# Patient Record
Sex: MALE | Race: BLACK OR AFRICAN AMERICAN | NOT HISPANIC OR LATINO
[De-identification: names, ages, dates, MRNs, and addresses within clinical notes are randomized per-mention and may not be internally consistent; named-entity substitution may affect disease eponyms.]

---

## 2017-06-29 ENCOUNTER — APPOINTMENT (OUTPATIENT)
Dept: ELECTROPHYSIOLOGY | Facility: CLINIC | Age: 77
End: 2017-06-29
Payer: MEDICARE

## 2017-06-29 PROCEDURE — 93290 INTERROG DEV EVAL ICPMS IP: CPT

## 2017-06-29 PROCEDURE — 93284 PRGRMG EVAL IMPLANTABLE DFB: CPT

## 2018-06-04 ENCOUNTER — APPOINTMENT (OUTPATIENT)
Dept: ELECTROPHYSIOLOGY | Facility: CLINIC | Age: 78
End: 2018-06-04
Payer: MEDICARE

## 2018-06-04 VITALS — RESPIRATION RATE: 16 BRPM | SYSTOLIC BLOOD PRESSURE: 130 MMHG | HEART RATE: 66 BPM | DIASTOLIC BLOOD PRESSURE: 74 MMHG

## 2018-06-04 PROCEDURE — 93290 INTERROG DEV EVAL ICPMS IP: CPT | Mod: 26

## 2018-06-04 PROCEDURE — 93284 PRGRMG EVAL IMPLANTABLE DFB: CPT

## 2019-04-08 ENCOUNTER — APPOINTMENT (OUTPATIENT)
Dept: ELECTROPHYSIOLOGY | Facility: CLINIC | Age: 79
End: 2019-04-08

## 2019-07-10 ENCOUNTER — APPOINTMENT (OUTPATIENT)
Dept: ELECTROPHYSIOLOGY | Facility: CLINIC | Age: 79
End: 2019-07-10
Payer: MEDICARE

## 2019-07-10 VITALS — HEART RATE: 64 BPM | SYSTOLIC BLOOD PRESSURE: 144 MMHG | DIASTOLIC BLOOD PRESSURE: 82 MMHG | RESPIRATION RATE: 14 BRPM

## 2019-07-10 PROCEDURE — 93290 INTERROG DEV EVAL ICPMS IP: CPT | Mod: 26

## 2019-07-10 PROCEDURE — 93284 PRGRMG EVAL IMPLANTABLE DFB: CPT

## 2019-07-10 RX ORDER — LOSARTAN POTASSIUM 50 MG/1
50 TABLET, FILM COATED ORAL
Refills: 0 | Status: ACTIVE | COMMUNITY

## 2019-10-15 ENCOUNTER — APPOINTMENT (OUTPATIENT)
Dept: ELECTROPHYSIOLOGY | Facility: CLINIC | Age: 79
End: 2019-10-15

## 2020-04-09 ENCOUNTER — APPOINTMENT (OUTPATIENT)
Dept: ELECTROPHYSIOLOGY | Facility: CLINIC | Age: 80
End: 2020-04-09

## 2020-07-13 ENCOUNTER — APPOINTMENT (OUTPATIENT)
Dept: ELECTROPHYSIOLOGY | Facility: CLINIC | Age: 80
End: 2020-07-13

## 2020-10-13 ENCOUNTER — APPOINTMENT (OUTPATIENT)
Dept: ELECTROPHYSIOLOGY | Facility: CLINIC | Age: 80
End: 2020-10-13

## 2022-06-24 ENCOUNTER — APPOINTMENT (OUTPATIENT)
Dept: ELECTROPHYSIOLOGY | Facility: CLINIC | Age: 82
End: 2022-06-24
Payer: MEDICARE

## 2022-06-24 VITALS — SYSTOLIC BLOOD PRESSURE: 117 MMHG | DIASTOLIC BLOOD PRESSURE: 80 MMHG | HEART RATE: 70 BPM | RESPIRATION RATE: 14 BRPM

## 2022-06-24 VITALS — RESPIRATION RATE: 14 BRPM | HEART RATE: 90 BPM

## 2022-06-24 PROCEDURE — 93284 PRGRMG EVAL IMPLANTABLE DFB: CPT

## 2022-06-24 PROCEDURE — 93290 INTERROG DEV EVAL ICPMS IP: CPT | Mod: 26

## 2023-06-23 ENCOUNTER — APPOINTMENT (OUTPATIENT)
Dept: ELECTROPHYSIOLOGY | Facility: CLINIC | Age: 83
End: 2023-06-23

## 2024-01-02 ENCOUNTER — NON-APPOINTMENT (OUTPATIENT)
Age: 84
End: 2024-01-02

## 2024-01-02 ENCOUNTER — APPOINTMENT (OUTPATIENT)
Dept: ELECTROPHYSIOLOGY | Facility: CLINIC | Age: 84
End: 2024-01-02
Payer: MEDICARE

## 2024-01-02 VITALS
OXYGEN SATURATION: 97 % | HEIGHT: 67 IN | WEIGHT: 143 LBS | DIASTOLIC BLOOD PRESSURE: 60 MMHG | HEART RATE: 60 BPM | BODY MASS INDEX: 22.44 KG/M2 | SYSTOLIC BLOOD PRESSURE: 100 MMHG

## 2024-01-02 DIAGNOSIS — Z95.810 PRESENCE OF AUTOMATIC (IMPLANTABLE) CARDIAC DEFIBRILLATOR: ICD-10-CM

## 2024-01-02 DIAGNOSIS — Z45.02 ENCOUNTER FOR ADJUSTMENT AND MANAGEMENT OF AUTOMATIC IMPLANTABLE CARDIAC DEFIBRILLATOR: ICD-10-CM

## 2024-01-02 DIAGNOSIS — I45.10 UNSPECIFIED RIGHT BUNDLE-BRANCH BLOCK: ICD-10-CM

## 2024-01-02 DIAGNOSIS — I10 ESSENTIAL (PRIMARY) HYPERTENSION: ICD-10-CM

## 2024-01-02 PROCEDURE — 93284 PRGRMG EVAL IMPLANTABLE DFB: CPT

## 2024-01-02 PROCEDURE — 93000 ELECTROCARDIOGRAM COMPLETE: CPT | Mod: 59

## 2024-01-02 PROCEDURE — 99204 OFFICE O/P NEW MOD 45 MIN: CPT | Mod: 25

## 2024-01-02 RX ORDER — HYDROCHLOROTHIAZIDE 25 MG/1
25 TABLET ORAL
Refills: 0 | Status: ACTIVE | COMMUNITY

## 2024-01-02 NOTE — REASON FOR VISIT
[Arrhythmia/ECG Abnorrmalities] : arrhythmia/ECG abnormalities [FreeTextEntry3] : Dr Edvin Cervantes

## 2024-01-02 NOTE — HISTORY OF PRESENT ILLNESS
[FreeTextEntry1] : Mr. JEANCLAUDE CHASSAGNE is a 83 year old man with past medical history of history of cardiomyopathy, s/p Bi V ICD in 2009, HTN, DM,CAD, prostate cancer, CHF who is here for BIVICD (MDT) genchange evaluation. Reports he notices his device beeping since Dec 1st 2023. Admits doing well. Device interrogation today revealed the battery reached ROLY on 11/29/23. LV lead threshold is high, 2.125 volts at 1 msec. He is not dependent. Turned off beeping. No shocks or events noted. Feeling well now.  Denies chest pain, palpitations, shortness of breath, dyspnea on exertion, syncope, light headedness or dizziness.

## 2024-01-02 NOTE — DISCUSSION/SUMMARY
[FreeTextEntry1] : IMPRESSIONS:  BIVICD Gen change:  He is doing well. Device interrogation today revealed the battery reached ROLY on 11/29/23. LV lead threshold is high, 2.125 volts at 1 msec. He is not dependent. Device is beeping since 12/1/23, has turned it off today. No shocks or events noted. Will recommend Medtronic BIVICD Gen change and down grade to BIVPPM. We discussed the procedures, risks and outcomes of ICD generator change and living with an ICD. We discussed management of ICD therapy throughout life, including deactivation of the ICD. After all questions were answered, and literature was provided, it was a shared decision to proceed with ICD therapy. Hold diabetes medication and blood thinners the morning of the procedure, if applicable. May take all other medication with a small sip of water.  HTN: resume oral antihypertensives as prescribed. Encouraged heart healthy diet, sodium restriction, and weight loss. Continue regular f/u with Cardiologist for further HTN management. [EKG obtained to assist in diagnosis and management of assessed problem(s)] : EKG obtained to assist in diagnosis and management of assessed problem(s)

## 2024-01-10 ENCOUNTER — OUTPATIENT (OUTPATIENT)
Dept: OUTPATIENT SERVICES | Facility: HOSPITAL | Age: 84
LOS: 1 days | End: 2024-01-10

## 2024-01-10 VITALS
RESPIRATION RATE: 14 BRPM | DIASTOLIC BLOOD PRESSURE: 77 MMHG | OXYGEN SATURATION: 98 % | HEART RATE: 68 BPM | HEIGHT: 65 IN | TEMPERATURE: 98 F | WEIGHT: 143.96 LBS | SYSTOLIC BLOOD PRESSURE: 116 MMHG

## 2024-01-10 DIAGNOSIS — Z95.810 PRESENCE OF AUTOMATIC (IMPLANTABLE) CARDIAC DEFIBRILLATOR: ICD-10-CM

## 2024-01-10 DIAGNOSIS — Z90.49 ACQUIRED ABSENCE OF OTHER SPECIFIED PARTS OF DIGESTIVE TRACT: Chronic | ICD-10-CM

## 2024-01-10 DIAGNOSIS — Z86.79 PERSONAL HISTORY OF OTHER DISEASES OF THE CIRCULATORY SYSTEM: ICD-10-CM

## 2024-01-10 LAB
A1C WITH ESTIMATED AVERAGE GLUCOSE RESULT: 6.5 % — HIGH (ref 4–5.6)
A1C WITH ESTIMATED AVERAGE GLUCOSE RESULT: 6.5 % — HIGH (ref 4–5.6)
ALBUMIN SERPL ELPH-MCNC: 4.4 G/DL — SIGNIFICANT CHANGE UP (ref 3.3–5)
ALBUMIN SERPL ELPH-MCNC: 4.4 G/DL — SIGNIFICANT CHANGE UP (ref 3.3–5)
ALP SERPL-CCNC: 59 U/L — SIGNIFICANT CHANGE UP (ref 40–120)
ALP SERPL-CCNC: 59 U/L — SIGNIFICANT CHANGE UP (ref 40–120)
ALT FLD-CCNC: 11 U/L — SIGNIFICANT CHANGE UP (ref 4–41)
ALT FLD-CCNC: 11 U/L — SIGNIFICANT CHANGE UP (ref 4–41)
ANION GAP SERPL CALC-SCNC: 14 MMOL/L — SIGNIFICANT CHANGE UP (ref 7–14)
ANION GAP SERPL CALC-SCNC: 14 MMOL/L — SIGNIFICANT CHANGE UP (ref 7–14)
AST SERPL-CCNC: 14 U/L — SIGNIFICANT CHANGE UP (ref 4–40)
AST SERPL-CCNC: 14 U/L — SIGNIFICANT CHANGE UP (ref 4–40)
BILIRUB SERPL-MCNC: 0.7 MG/DL — SIGNIFICANT CHANGE UP (ref 0.2–1.2)
BILIRUB SERPL-MCNC: 0.7 MG/DL — SIGNIFICANT CHANGE UP (ref 0.2–1.2)
BUN SERPL-MCNC: 20 MG/DL — SIGNIFICANT CHANGE UP (ref 7–23)
BUN SERPL-MCNC: 20 MG/DL — SIGNIFICANT CHANGE UP (ref 7–23)
CALCIUM SERPL-MCNC: 9.7 MG/DL — SIGNIFICANT CHANGE UP (ref 8.4–10.5)
CALCIUM SERPL-MCNC: 9.7 MG/DL — SIGNIFICANT CHANGE UP (ref 8.4–10.5)
CHLORIDE SERPL-SCNC: 94 MMOL/L — LOW (ref 98–107)
CHLORIDE SERPL-SCNC: 94 MMOL/L — LOW (ref 98–107)
CO2 SERPL-SCNC: 25 MMOL/L — SIGNIFICANT CHANGE UP (ref 22–31)
CO2 SERPL-SCNC: 25 MMOL/L — SIGNIFICANT CHANGE UP (ref 22–31)
CREAT SERPL-MCNC: 1.24 MG/DL — SIGNIFICANT CHANGE UP (ref 0.5–1.3)
CREAT SERPL-MCNC: 1.24 MG/DL — SIGNIFICANT CHANGE UP (ref 0.5–1.3)
EGFR: 58 ML/MIN/1.73M2 — LOW
EGFR: 58 ML/MIN/1.73M2 — LOW
ESTIMATED AVERAGE GLUCOSE: 140 — SIGNIFICANT CHANGE UP
ESTIMATED AVERAGE GLUCOSE: 140 — SIGNIFICANT CHANGE UP
GLUCOSE SERPL-MCNC: 126 MG/DL — HIGH (ref 70–99)
GLUCOSE SERPL-MCNC: 126 MG/DL — HIGH (ref 70–99)
HCT VFR BLD CALC: 46.5 % — SIGNIFICANT CHANGE UP (ref 39–50)
HCT VFR BLD CALC: 46.5 % — SIGNIFICANT CHANGE UP (ref 39–50)
HGB BLD-MCNC: 16 G/DL — SIGNIFICANT CHANGE UP (ref 13–17)
HGB BLD-MCNC: 16 G/DL — SIGNIFICANT CHANGE UP (ref 13–17)
MCHC RBC-ENTMCNC: 31.6 PG — SIGNIFICANT CHANGE UP (ref 27–34)
MCHC RBC-ENTMCNC: 31.6 PG — SIGNIFICANT CHANGE UP (ref 27–34)
MCHC RBC-ENTMCNC: 34.4 GM/DL — SIGNIFICANT CHANGE UP (ref 32–36)
MCHC RBC-ENTMCNC: 34.4 GM/DL — SIGNIFICANT CHANGE UP (ref 32–36)
MCV RBC AUTO: 91.9 FL — SIGNIFICANT CHANGE UP (ref 80–100)
MCV RBC AUTO: 91.9 FL — SIGNIFICANT CHANGE UP (ref 80–100)
NRBC # BLD: 0 /100 WBCS — SIGNIFICANT CHANGE UP (ref 0–0)
NRBC # BLD: 0 /100 WBCS — SIGNIFICANT CHANGE UP (ref 0–0)
NRBC # FLD: 0 K/UL — SIGNIFICANT CHANGE UP (ref 0–0)
NRBC # FLD: 0 K/UL — SIGNIFICANT CHANGE UP (ref 0–0)
PLATELET # BLD AUTO: 278 K/UL — SIGNIFICANT CHANGE UP (ref 150–400)
PLATELET # BLD AUTO: 278 K/UL — SIGNIFICANT CHANGE UP (ref 150–400)
POTASSIUM SERPL-MCNC: 4.3 MMOL/L — SIGNIFICANT CHANGE UP (ref 3.5–5.3)
POTASSIUM SERPL-MCNC: 4.3 MMOL/L — SIGNIFICANT CHANGE UP (ref 3.5–5.3)
POTASSIUM SERPL-SCNC: 4.3 MMOL/L — SIGNIFICANT CHANGE UP (ref 3.5–5.3)
POTASSIUM SERPL-SCNC: 4.3 MMOL/L — SIGNIFICANT CHANGE UP (ref 3.5–5.3)
PROT SERPL-MCNC: 7.6 G/DL — SIGNIFICANT CHANGE UP (ref 6–8.3)
PROT SERPL-MCNC: 7.6 G/DL — SIGNIFICANT CHANGE UP (ref 6–8.3)
RBC # BLD: 5.06 M/UL — SIGNIFICANT CHANGE UP (ref 4.2–5.8)
RBC # BLD: 5.06 M/UL — SIGNIFICANT CHANGE UP (ref 4.2–5.8)
RBC # FLD: 12.6 % — SIGNIFICANT CHANGE UP (ref 10.3–14.5)
RBC # FLD: 12.6 % — SIGNIFICANT CHANGE UP (ref 10.3–14.5)
SODIUM SERPL-SCNC: 133 MMOL/L — LOW (ref 135–145)
SODIUM SERPL-SCNC: 133 MMOL/L — LOW (ref 135–145)
WBC # BLD: 7.71 K/UL — SIGNIFICANT CHANGE UP (ref 3.8–10.5)
WBC # BLD: 7.71 K/UL — SIGNIFICANT CHANGE UP (ref 3.8–10.5)
WBC # FLD AUTO: 7.71 K/UL — SIGNIFICANT CHANGE UP (ref 3.8–10.5)
WBC # FLD AUTO: 7.71 K/UL — SIGNIFICANT CHANGE UP (ref 3.8–10.5)

## 2024-01-10 NOTE — H&P PST ADULT - WEIGHT IN KG
April 20, 2018      [facility] [address] [address]  Phone: [phone]  Fax: [phone]       Patient: Michelle Miranda   YOB: 1975  Date of Visit: 04/20/2018    To Whom It May Concern:    Lakia Miranda  was at [facility] on 04/20/2018. She may return to work/school on 4/24/18 with no restrictions OR sooner IF fever free for 24 hours (fever is 100.4F or greater). If you have any questions or concerns, or if I can be of further assistance, please do not hesitate to contact me.    Sincerely,        Ines Vizcaino NP      65.3

## 2024-01-10 NOTE — H&P PST ADULT - NSICDXPASTSURGICALHX_GEN_ALL_CORE_FT
PAST SURGICAL HISTORY:  AICD (automatic cardioverter/defibrillator) present Medtronic    Cardiac Pacemaker,  Defibrillator 11/7/2009    Radium seed implants 12/2004    S/P Appendectomy 1962    S/P CABG X 4 1996    S/P Coronary Angiogram 1 stent placement 1998     PAST SURGICAL HISTORY:  AICD (automatic cardioverter/defibrillator) present Medtronic    Cardiac Pacemaker,  Defibrillator 11/7/2009    Radium seed implants 12/2004    S/P Appendectomy 1962    S/P CABG X 4 1996    S/P colon resection     S/P Coronary Angiogram 1 stent placement 1998

## 2024-01-10 NOTE — H&P PST ADULT - BP NONINVASIVE MEAN (MM HG)
What Type Of Note Output Would You Prefer (Optional)?: Standard Output How Severe Is Your Acne?: moderate Is This A New Presentation, Or A Follow-Up?: Follow Up Acne 90

## 2024-01-10 NOTE — H&P PST ADULT - PROBLEM SELECTOR PLAN 1
Pt scheduled for MDT Bi V ICD down grade to Biv pacemaker on 1/17/2024.  labs done results pending, ekg in chart.  Chlorhexidine provided-  written and verbal instructions given, with teach back, pt able to verbalize understanding.  Preop teaching done, pt able to verbalize understanding.   medication day of procedure-  timolol, aspirin, losartan, metoprolol, plavix, hctz  dm -  no metformin day of procedure  anesthesia-  ANGEL precautions

## 2024-01-10 NOTE — H&P PST ADULT - NS MD HP INPLANTS MED DEV
medtronic [id number] serial [id number]/Automatic Implantable Cardioverter Defibrillator medtronic [id number] serial [id number]/Automatic Implantable Cardioverter Defibrillator

## 2024-01-10 NOTE — H&P PST ADULT - CARDIOVASCULAR COMMENTS
able to climb a flight of stairs left chest pacemaker able to climb a flight of stairs, left chest pacemaker left chest AICD

## 2024-01-10 NOTE — H&P PST ADULT - NSICDXPASTMEDICALHX_GEN_ALL_CORE_FT
PAST MEDICAL HISTORY:  ACID/Pacer Medtronic model # [id number]    Acute Myocardial Infarction (ICD9 410.90) 10/93 and 2/96    Back Pain     BPH (Benign Prostatic Hyperplasia)     CAD (Coronary Artery Disease) (ICD9 414.00)     Diabetes Mellitus Type 2 in Nonobese (ICD9 250.00)     History of Prostate Cancer (ICD9 V10.46) S/P seed implant 2004    HTN (Hypertension) (ICD9 401.9)     Hypercholesteremia     Left Ventricular Dysfunction (ICD9 428.1) h/o    Legal Blindness,  right eye right eye    Migraines     Seasonal Allergies     Unspecified systolic (congestive) heart failure      PAST MEDICAL HISTORY:  ACID/Pacer Medtronic model # [id number]    Acute Myocardial Infarction (ICD9 410.90) 10/93 and 2/96    Back Pain     BPH (Benign Prostatic Hyperplasia)     CAD (Coronary Artery Disease) (ICD9 414.00)     Diabetes Mellitus Type 2 in Nonobese (ICD9 250.00)     History of Prostate Cancer (ICD9 V10.46) S/P seed implant 2004    HTN (Hypertension) (ICD9 401.9)     Hypercholesteremia     Left Ventricular Dysfunction (ICD9 428.1) h/o    Legal Blindness,  right eye right eye    Migraines     Seasonal Allergies     Unspecified systolic (congestive) heart failure      PAST MEDICAL HISTORY:  ACID/Pacer Medtronic model # [id number]    Acute Myocardial Infarction (ICD9 410.90) 10/93 and 2/96    Back Pain     BPH (Benign Prostatic Hyperplasia)     CAD (Coronary Artery Disease) (ICD9 414.00)     Colon cancer     CVA (cerebrovascular accident)     Diabetes Mellitus Type 2 in Nonobese (ICD9 250.00)     Glaucoma     History of Prostate Cancer (ICD9 V10.46) S/P seed implant 2004    HTN (Hypertension) (ICD9 401.9)     Hypercholesteremia     Left Ventricular Dysfunction (ICD9 428.1) h/o    Legal Blindness,  right eye right eye    Unspecified systolic (congestive) heart failure      PAST MEDICAL HISTORY:  ACID/Pacer Medtronic model # [id number]    Acute Myocardial Infarction (ICD9 410.90) 10/93 and 2/96    Back Pain     BPH (Benign Prostatic Hyperplasia)     CAD (Coronary Artery Disease) (ICD9 414.00)     Colon cancer     CVA (cerebrovascular accident)     Diabetes Mellitus Type 2 in Nonobese (ICD9 250.00)     Glaucoma     History of Prostate Cancer (ICD9 V10.46) S/P seed implant 2004    HTN (Hypertension) (ICD9 401.9)     Hypercholesteremia     Left Ventricular Dysfunction (ICD9 428.1) h/o    Legal Blindness,  right eye right eye    Unspecified systolic (congestive) heart failure

## 2024-01-10 NOTE — H&P PST ADULT - LAST CARDIAC ANGIOGRAM/IMAGING
1998 - 1 stent  inserted & 2000 no stent required 1998 - 1 stent  inserted & 2000 no stent required- doesn't remember where procedures were done

## 2024-01-10 NOTE — H&P PST ADULT - MUSCULOSKELETAL
details… no joint swelling/no joint erythema/no joint warmth ROM intact/no joint swelling/strength 5/5 bilateral upper extremities/strength 5/5 bilateral lower extremities

## 2024-01-10 NOTE — H&P PST ADULT - HISTORY OF PRESENT ILLNESS
82y/o male scheduled for MDT Bi V ICD down grade to Biv pacemaker on 1/17/2024.  Pt with hx of cardiomyopathy, s/p Bi V ICD 2009, htn, dm type 2, CABG X4 1995, cardiac stent X1 1998,  prostate cancer tx with radioactive seeds 2004, colon cancer 2015 tx with colon resection.  Since 12/2023 has had ringing in pacemaker." 84y/o male scheduled for MDT Bi V ICD down grade to Biv pacemaker on 1/17/2024.  Pt with hx of cardiomyopathy, s/p Bi V ICD 2009, htn, dm type 2, CABG X4 1995, cardiac stent X1 1998,  prostate cancer tx with radioactive seeds 2004, colon cancer 2015 tx with colon resection.  Since 12/2023 has had ringing in pacemaker." 84y/o male scheduled for MDT Bi V ICD down grade to Biv pacemaker on 1/17/2024.  Pt with hx of cardiomyopathy, s/p Bi V ICD 2009, glaucoma, CVA X2 1993,  htn, dm type 2, CABG X4 1995, cardiac stent X1 1998,  prostate cancer tx with radioactive seeds 2004, colon cancer 2015 tx with colon resection denies chemo and radiation.  Since 12/2023 has had ringing in pacemaker."

## 2024-01-10 NOTE — H&P PST ADULT - GASTROINTESTINAL
details… normal/soft/nontender/nondistended/normal active bowel sounds/no guarding/no rigidity/no organomegaly/no palpable kasia/no masses palpable

## 2024-01-16 ENCOUNTER — INPATIENT (INPATIENT)
Facility: HOSPITAL | Age: 84
LOS: 5 days | Discharge: HOME | End: 2024-01-22
Attending: HOSPITALIST | Admitting: HOSPITALIST
Payer: MEDICARE

## 2024-01-16 VITALS
TEMPERATURE: 98 F | SYSTOLIC BLOOD PRESSURE: 139 MMHG | RESPIRATION RATE: 18 BRPM | HEIGHT: 65 IN | HEART RATE: 73 BPM | OXYGEN SATURATION: 99 % | DIASTOLIC BLOOD PRESSURE: 85 MMHG

## 2024-01-16 DIAGNOSIS — Z29.9 ENCOUNTER FOR PROPHYLACTIC MEASURES, UNSPECIFIED: ICD-10-CM

## 2024-01-16 DIAGNOSIS — E11.65 TYPE 2 DIABETES MELLITUS WITH HYPERGLYCEMIA: ICD-10-CM

## 2024-01-16 DIAGNOSIS — Z90.49 ACQUIRED ABSENCE OF OTHER SPECIFIED PARTS OF DIGESTIVE TRACT: Chronic | ICD-10-CM

## 2024-01-16 DIAGNOSIS — I10 ESSENTIAL (PRIMARY) HYPERTENSION: ICD-10-CM

## 2024-01-16 DIAGNOSIS — G93.40 ENCEPHALOPATHY, UNSPECIFIED: ICD-10-CM

## 2024-01-16 DIAGNOSIS — I25.10 ATHEROSCLEROTIC HEART DISEASE OF NATIVE CORONARY ARTERY WITHOUT ANGINA PECTORIS: ICD-10-CM

## 2024-01-16 DIAGNOSIS — I21.4 NON-ST ELEVATION (NSTEMI) MYOCARDIAL INFARCTION: ICD-10-CM

## 2024-01-16 DIAGNOSIS — Z86.73 PERSONAL HISTORY OF TRANSIENT ISCHEMIC ATTACK (TIA), AND CEREBRAL INFARCTION WITHOUT RESIDUAL DEFICITS: ICD-10-CM

## 2024-01-16 DIAGNOSIS — R07.9 CHEST PAIN, UNSPECIFIED: ICD-10-CM

## 2024-01-16 PROBLEM — I63.9 CEREBRAL INFARCTION, UNSPECIFIED: Chronic | Status: ACTIVE | Noted: 2024-01-10

## 2024-01-16 PROBLEM — H40.9 UNSPECIFIED GLAUCOMA: Chronic | Status: ACTIVE | Noted: 2024-01-10

## 2024-01-16 PROBLEM — C18.9 MALIGNANT NEOPLASM OF COLON, UNSPECIFIED: Chronic | Status: ACTIVE | Noted: 2024-01-10

## 2024-01-16 LAB
ALBUMIN SERPL ELPH-MCNC: 3.5 G/DL — SIGNIFICANT CHANGE UP (ref 3.3–5)
ALBUMIN SERPL ELPH-MCNC: 4.2 G/DL — SIGNIFICANT CHANGE UP (ref 3.3–5)
ALBUMIN SERPL ELPH-MCNC: 4.2 G/DL — SIGNIFICANT CHANGE UP (ref 3.3–5)
ALP SERPL-CCNC: 51 U/L — SIGNIFICANT CHANGE UP (ref 40–120)
ALP SERPL-CCNC: 61 U/L — SIGNIFICANT CHANGE UP (ref 40–120)
ALP SERPL-CCNC: 61 U/L — SIGNIFICANT CHANGE UP (ref 40–120)
ALT FLD-CCNC: 10 U/L — SIGNIFICANT CHANGE UP (ref 4–41)
ALT FLD-CCNC: 10 U/L — SIGNIFICANT CHANGE UP (ref 4–41)
ALT FLD-CCNC: 11 U/L — SIGNIFICANT CHANGE UP (ref 4–41)
ANION GAP SERPL CALC-SCNC: 11 MMOL/L — SIGNIFICANT CHANGE UP (ref 7–14)
ANION GAP SERPL CALC-SCNC: 11 MMOL/L — SIGNIFICANT CHANGE UP (ref 7–14)
ANION GAP SERPL CALC-SCNC: 12 MMOL/L — SIGNIFICANT CHANGE UP (ref 7–14)
APPEARANCE UR: CLEAR — SIGNIFICANT CHANGE UP
APPEARANCE UR: CLEAR — SIGNIFICANT CHANGE UP
APTT BLD: 30.4 SEC — SIGNIFICANT CHANGE UP (ref 24.5–35.6)
APTT BLD: 30.4 SEC — SIGNIFICANT CHANGE UP (ref 24.5–35.6)
AST SERPL-CCNC: 26 U/L — SIGNIFICANT CHANGE UP (ref 4–40)
AST SERPL-CCNC: 26 U/L — SIGNIFICANT CHANGE UP (ref 4–40)
AST SERPL-CCNC: 72 U/L — HIGH (ref 4–40)
BACTERIA # UR AUTO: NEGATIVE /HPF — SIGNIFICANT CHANGE UP
BASOPHILS # BLD AUTO: 0.05 K/UL — SIGNIFICANT CHANGE UP (ref 0–0.2)
BASOPHILS # BLD AUTO: 0.05 K/UL — SIGNIFICANT CHANGE UP (ref 0–0.2)
BASOPHILS NFR BLD AUTO: 0.6 % — SIGNIFICANT CHANGE UP (ref 0–2)
BASOPHILS NFR BLD AUTO: 0.6 % — SIGNIFICANT CHANGE UP (ref 0–2)
BILIRUB SERPL-MCNC: 0.8 MG/DL — SIGNIFICANT CHANGE UP (ref 0.2–1.2)
BILIRUB SERPL-MCNC: 0.9 MG/DL — SIGNIFICANT CHANGE UP (ref 0.2–1.2)
BILIRUB SERPL-MCNC: 0.9 MG/DL — SIGNIFICANT CHANGE UP (ref 0.2–1.2)
BILIRUB UR-MCNC: NEGATIVE — SIGNIFICANT CHANGE UP
BILIRUB UR-MCNC: NEGATIVE — SIGNIFICANT CHANGE UP
BUN SERPL-MCNC: 12 MG/DL — SIGNIFICANT CHANGE UP (ref 7–23)
BUN SERPL-MCNC: 14 MG/DL — SIGNIFICANT CHANGE UP (ref 7–23)
BUN SERPL-MCNC: 14 MG/DL — SIGNIFICANT CHANGE UP (ref 7–23)
CALCIUM SERPL-MCNC: 8.7 MG/DL — SIGNIFICANT CHANGE UP (ref 8.4–10.5)
CALCIUM SERPL-MCNC: 9.6 MG/DL — SIGNIFICANT CHANGE UP (ref 8.4–10.5)
CALCIUM SERPL-MCNC: 9.6 MG/DL — SIGNIFICANT CHANGE UP (ref 8.4–10.5)
CAST: 1 /LPF — SIGNIFICANT CHANGE UP (ref 0–4)
CHLORIDE SERPL-SCNC: 93 MMOL/L — LOW (ref 98–107)
CHLORIDE SERPL-SCNC: 93 MMOL/L — LOW (ref 98–107)
CHLORIDE SERPL-SCNC: 95 MMOL/L — LOW (ref 98–107)
CK MB BLD-MCNC: 18.1 % — HIGH (ref 0–2.5)
CK MB CFR SERPL CALC: 105.7 NG/ML — HIGH
CK SERPL-CCNC: 584 U/L — HIGH (ref 30–200)
CO2 SERPL-SCNC: 25 MMOL/L — SIGNIFICANT CHANGE UP (ref 22–31)
CO2 SERPL-SCNC: 27 MMOL/L — SIGNIFICANT CHANGE UP (ref 22–31)
CO2 SERPL-SCNC: 27 MMOL/L — SIGNIFICANT CHANGE UP (ref 22–31)
COLOR SPEC: YELLOW — SIGNIFICANT CHANGE UP
COLOR SPEC: YELLOW — SIGNIFICANT CHANGE UP
CREAT SERPL-MCNC: 1.08 MG/DL — SIGNIFICANT CHANGE UP (ref 0.5–1.3)
CREAT SERPL-MCNC: 1.09 MG/DL — SIGNIFICANT CHANGE UP (ref 0.5–1.3)
CREAT SERPL-MCNC: 1.09 MG/DL — SIGNIFICANT CHANGE UP (ref 0.5–1.3)
DIFF PNL FLD: NEGATIVE — SIGNIFICANT CHANGE UP
DIFF PNL FLD: NEGATIVE — SIGNIFICANT CHANGE UP
EGFR: 67 ML/MIN/1.73M2 — SIGNIFICANT CHANGE UP
EGFR: 67 ML/MIN/1.73M2 — SIGNIFICANT CHANGE UP
EGFR: 68 ML/MIN/1.73M2 — SIGNIFICANT CHANGE UP
EOSINOPHIL # BLD AUTO: 0.02 K/UL — SIGNIFICANT CHANGE UP (ref 0–0.5)
EOSINOPHIL # BLD AUTO: 0.02 K/UL — SIGNIFICANT CHANGE UP (ref 0–0.5)
EOSINOPHIL NFR BLD AUTO: 0.3 % — SIGNIFICANT CHANGE UP (ref 0–6)
EOSINOPHIL NFR BLD AUTO: 0.3 % — SIGNIFICANT CHANGE UP (ref 0–6)
GLUCOSE SERPL-MCNC: 129 MG/DL — HIGH (ref 70–99)
GLUCOSE SERPL-MCNC: 177 MG/DL — HIGH (ref 70–99)
GLUCOSE SERPL-MCNC: 177 MG/DL — HIGH (ref 70–99)
GLUCOSE UR QL: 100 MG/DL
GLUCOSE UR QL: 100 MG/DL
HCT VFR BLD CALC: 38 % — LOW (ref 39–50)
HCT VFR BLD CALC: 44.7 % — SIGNIFICANT CHANGE UP (ref 39–50)
HCT VFR BLD CALC: 44.7 % — SIGNIFICANT CHANGE UP (ref 39–50)
HGB BLD-MCNC: 13.4 G/DL — SIGNIFICANT CHANGE UP (ref 13–17)
HGB BLD-MCNC: 15.5 G/DL — SIGNIFICANT CHANGE UP (ref 13–17)
HGB BLD-MCNC: 15.5 G/DL — SIGNIFICANT CHANGE UP (ref 13–17)
IANC: 6.39 K/UL — SIGNIFICANT CHANGE UP (ref 1.8–7.4)
IANC: 6.39 K/UL — SIGNIFICANT CHANGE UP (ref 1.8–7.4)
IMM GRANULOCYTES NFR BLD AUTO: 0.4 % — SIGNIFICANT CHANGE UP (ref 0–0.9)
IMM GRANULOCYTES NFR BLD AUTO: 0.4 % — SIGNIFICANT CHANGE UP (ref 0–0.9)
INR BLD: 0.97 RATIO — SIGNIFICANT CHANGE UP (ref 0.85–1.18)
INR BLD: 0.97 RATIO — SIGNIFICANT CHANGE UP (ref 0.85–1.18)
KETONES UR-MCNC: 15 MG/DL
KETONES UR-MCNC: 15 MG/DL
LEUKOCYTE ESTERASE UR-ACNC: NEGATIVE — SIGNIFICANT CHANGE UP
LEUKOCYTE ESTERASE UR-ACNC: NEGATIVE — SIGNIFICANT CHANGE UP
LIDOCAIN IGE QN: 26 U/L — SIGNIFICANT CHANGE UP (ref 7–60)
LIDOCAIN IGE QN: 26 U/L — SIGNIFICANT CHANGE UP (ref 7–60)
LYMPHOCYTES # BLD AUTO: 1 K/UL — SIGNIFICANT CHANGE UP (ref 1–3.3)
LYMPHOCYTES # BLD AUTO: 1 K/UL — SIGNIFICANT CHANGE UP (ref 1–3.3)
LYMPHOCYTES # BLD AUTO: 12.6 % — LOW (ref 13–44)
LYMPHOCYTES # BLD AUTO: 12.6 % — LOW (ref 13–44)
MAGNESIUM SERPL-MCNC: 1.8 MG/DL — SIGNIFICANT CHANGE UP (ref 1.6–2.6)
MAGNESIUM SERPL-MCNC: 1.8 MG/DL — SIGNIFICANT CHANGE UP (ref 1.6–2.6)
MCHC RBC-ENTMCNC: 31.8 PG — SIGNIFICANT CHANGE UP (ref 27–34)
MCHC RBC-ENTMCNC: 31.8 PG — SIGNIFICANT CHANGE UP (ref 27–34)
MCHC RBC-ENTMCNC: 32 PG — SIGNIFICANT CHANGE UP (ref 27–34)
MCHC RBC-ENTMCNC: 34.7 GM/DL — SIGNIFICANT CHANGE UP (ref 32–36)
MCHC RBC-ENTMCNC: 34.7 GM/DL — SIGNIFICANT CHANGE UP (ref 32–36)
MCHC RBC-ENTMCNC: 35.3 GM/DL — SIGNIFICANT CHANGE UP (ref 32–36)
MCV RBC AUTO: 90.7 FL — SIGNIFICANT CHANGE UP (ref 80–100)
MCV RBC AUTO: 91.6 FL — SIGNIFICANT CHANGE UP (ref 80–100)
MCV RBC AUTO: 91.6 FL — SIGNIFICANT CHANGE UP (ref 80–100)
MONOCYTES # BLD AUTO: 0.42 K/UL — SIGNIFICANT CHANGE UP (ref 0–0.9)
MONOCYTES # BLD AUTO: 0.42 K/UL — SIGNIFICANT CHANGE UP (ref 0–0.9)
MONOCYTES NFR BLD AUTO: 5.3 % — SIGNIFICANT CHANGE UP (ref 2–14)
MONOCYTES NFR BLD AUTO: 5.3 % — SIGNIFICANT CHANGE UP (ref 2–14)
NEUTROPHILS # BLD AUTO: 6.39 K/UL — SIGNIFICANT CHANGE UP (ref 1.8–7.4)
NEUTROPHILS # BLD AUTO: 6.39 K/UL — SIGNIFICANT CHANGE UP (ref 1.8–7.4)
NEUTROPHILS NFR BLD AUTO: 80.8 % — HIGH (ref 43–77)
NEUTROPHILS NFR BLD AUTO: 80.8 % — HIGH (ref 43–77)
NITRITE UR-MCNC: NEGATIVE — SIGNIFICANT CHANGE UP
NITRITE UR-MCNC: NEGATIVE — SIGNIFICANT CHANGE UP
NRBC # BLD: 0 /100 WBCS — SIGNIFICANT CHANGE UP (ref 0–0)
NRBC # FLD: 0 K/UL — SIGNIFICANT CHANGE UP (ref 0–0)
PH UR: 6.5 — SIGNIFICANT CHANGE UP (ref 5–8)
PH UR: 6.5 — SIGNIFICANT CHANGE UP (ref 5–8)
PHOSPHATE SERPL-MCNC: 2.2 MG/DL — LOW (ref 2.5–4.5)
PHOSPHATE SERPL-MCNC: 2.2 MG/DL — LOW (ref 2.5–4.5)
PLATELET # BLD AUTO: 202 K/UL — SIGNIFICANT CHANGE UP (ref 150–400)
PLATELET # BLD AUTO: 261 K/UL — SIGNIFICANT CHANGE UP (ref 150–400)
PLATELET # BLD AUTO: 261 K/UL — SIGNIFICANT CHANGE UP (ref 150–400)
POTASSIUM SERPL-MCNC: 3.5 MMOL/L — SIGNIFICANT CHANGE UP (ref 3.5–5.3)
POTASSIUM SERPL-MCNC: 4.5 MMOL/L — SIGNIFICANT CHANGE UP (ref 3.5–5.3)
POTASSIUM SERPL-MCNC: 4.5 MMOL/L — SIGNIFICANT CHANGE UP (ref 3.5–5.3)
POTASSIUM SERPL-SCNC: 3.5 MMOL/L — SIGNIFICANT CHANGE UP (ref 3.5–5.3)
POTASSIUM SERPL-SCNC: 4.5 MMOL/L — SIGNIFICANT CHANGE UP (ref 3.5–5.3)
POTASSIUM SERPL-SCNC: 4.5 MMOL/L — SIGNIFICANT CHANGE UP (ref 3.5–5.3)
PROT SERPL-MCNC: 6.1 G/DL — SIGNIFICANT CHANGE UP (ref 6–8.3)
PROT SERPL-MCNC: 7.2 G/DL — SIGNIFICANT CHANGE UP (ref 6–8.3)
PROT SERPL-MCNC: 7.2 G/DL — SIGNIFICANT CHANGE UP (ref 6–8.3)
PROT UR-MCNC: 30 MG/DL
PROT UR-MCNC: 30 MG/DL
PROTHROM AB SERPL-ACNC: 10.9 SEC — SIGNIFICANT CHANGE UP (ref 9.5–13)
PROTHROM AB SERPL-ACNC: 10.9 SEC — SIGNIFICANT CHANGE UP (ref 9.5–13)
RBC # BLD: 4.19 M/UL — LOW (ref 4.2–5.8)
RBC # BLD: 4.88 M/UL — SIGNIFICANT CHANGE UP (ref 4.2–5.8)
RBC # BLD: 4.88 M/UL — SIGNIFICANT CHANGE UP (ref 4.2–5.8)
RBC # FLD: 12.3 % — SIGNIFICANT CHANGE UP (ref 10.3–14.5)
RBC # FLD: 12.3 % — SIGNIFICANT CHANGE UP (ref 10.3–14.5)
RBC # FLD: 12.5 % — SIGNIFICANT CHANGE UP (ref 10.3–14.5)
RBC CASTS # UR COMP ASSIST: 7 /HPF — HIGH (ref 0–4)
SODIUM SERPL-SCNC: 131 MMOL/L — LOW (ref 135–145)
SODIUM SERPL-SCNC: 131 MMOL/L — LOW (ref 135–145)
SODIUM SERPL-SCNC: 132 MMOL/L — LOW (ref 135–145)
SP GR SPEC: 1.02 — SIGNIFICANT CHANGE UP (ref 1–1.03)
SP GR SPEC: 1.02 — SIGNIFICANT CHANGE UP (ref 1–1.03)
SQUAMOUS # UR AUTO: 0 /HPF — SIGNIFICANT CHANGE UP (ref 0–5)
TROPONIN T, HIGH SENSITIVITY RESULT: 158 NG/L — CRITICAL HIGH
TROPONIN T, HIGH SENSITIVITY RESULT: 158 NG/L — CRITICAL HIGH
TROPONIN T, HIGH SENSITIVITY RESULT: 343 NG/L — CRITICAL HIGH
TROPONIN T, HIGH SENSITIVITY RESULT: 99 NG/L — CRITICAL HIGH
TROPONIN T, HIGH SENSITIVITY RESULT: 99 NG/L — CRITICAL HIGH
UROBILINOGEN FLD QL: 1 MG/DL — SIGNIFICANT CHANGE UP (ref 0.2–1)
UROBILINOGEN FLD QL: 1 MG/DL — SIGNIFICANT CHANGE UP (ref 0.2–1)
WBC # BLD: 7.91 K/UL — SIGNIFICANT CHANGE UP (ref 3.8–10.5)
WBC # BLD: 7.91 K/UL — SIGNIFICANT CHANGE UP (ref 3.8–10.5)
WBC # BLD: 8.37 K/UL — SIGNIFICANT CHANGE UP (ref 3.8–10.5)
WBC # FLD AUTO: 7.91 K/UL — SIGNIFICANT CHANGE UP (ref 3.8–10.5)
WBC # FLD AUTO: 7.91 K/UL — SIGNIFICANT CHANGE UP (ref 3.8–10.5)
WBC # FLD AUTO: 8.37 K/UL — SIGNIFICANT CHANGE UP (ref 3.8–10.5)
WBC UR QL: 0 /HPF — SIGNIFICANT CHANGE UP (ref 0–5)

## 2024-01-16 PROCEDURE — 99222 1ST HOSP IP/OBS MODERATE 55: CPT | Mod: GC,25

## 2024-01-16 PROCEDURE — 93010 ELECTROCARDIOGRAM REPORT: CPT | Mod: 77

## 2024-01-16 PROCEDURE — 74176 CT ABD & PELVIS W/O CONTRAST: CPT | Mod: 26,MA

## 2024-01-16 PROCEDURE — 93284 PRGRMG EVAL IMPLANTABLE DFB: CPT | Mod: 26,GC

## 2024-01-16 PROCEDURE — 99223 1ST HOSP IP/OBS HIGH 75: CPT

## 2024-01-16 PROCEDURE — 71045 X-RAY EXAM CHEST 1 VIEW: CPT | Mod: 26

## 2024-01-16 PROCEDURE — 70450 CT HEAD/BRAIN W/O DYE: CPT | Mod: 26

## 2024-01-16 PROCEDURE — 99291 CRITICAL CARE FIRST HOUR: CPT

## 2024-01-16 RX ORDER — SODIUM CHLORIDE 9 MG/ML
500 INJECTION INTRAMUSCULAR; INTRAVENOUS; SUBCUTANEOUS ONCE
Refills: 0 | Status: COMPLETED | OUTPATIENT
Start: 2024-01-16 | End: 2024-01-16

## 2024-01-16 RX ORDER — SODIUM CHLORIDE 9 MG/ML
1000 INJECTION INTRAMUSCULAR; INTRAVENOUS; SUBCUTANEOUS ONCE
Refills: 0 | Status: DISCONTINUED | OUTPATIENT
Start: 2024-01-16 | End: 2024-01-16

## 2024-01-16 RX ORDER — DEXTROSE 50 % IN WATER 50 %
15 SYRINGE (ML) INTRAVENOUS ONCE
Refills: 0 | Status: DISCONTINUED | OUTPATIENT
Start: 2024-01-16 | End: 2024-01-22

## 2024-01-16 RX ORDER — LATANOPROST 0.05 MG/ML
1 SOLUTION/ DROPS OPHTHALMIC; TOPICAL AT BEDTIME
Refills: 0 | Status: DISCONTINUED | OUTPATIENT
Start: 2024-01-16 | End: 2024-01-22

## 2024-01-16 RX ORDER — ASPIRIN/CALCIUM CARB/MAGNESIUM 324 MG
81 TABLET ORAL DAILY
Refills: 0 | Status: DISCONTINUED | OUTPATIENT
Start: 2024-01-17 | End: 2024-01-22

## 2024-01-16 RX ORDER — HEPARIN SODIUM 5000 [USP'U]/ML
INJECTION INTRAVENOUS; SUBCUTANEOUS
Qty: 25000 | Refills: 0 | Status: DISCONTINUED | OUTPATIENT
Start: 2024-01-16 | End: 2024-01-18

## 2024-01-16 RX ORDER — TIMOLOL 0.5 %
1 DROPS OPHTHALMIC (EYE)
Refills: 0 | DISCHARGE

## 2024-01-16 RX ORDER — ONDANSETRON 8 MG/1
4 TABLET, FILM COATED ORAL ONCE
Refills: 0 | Status: COMPLETED | OUTPATIENT
Start: 2024-01-16 | End: 2024-01-16

## 2024-01-16 RX ORDER — HEPARIN SODIUM 5000 [USP'U]/ML
3800 INJECTION INTRAVENOUS; SUBCUTANEOUS EVERY 6 HOURS
Refills: 0 | Status: DISCONTINUED | OUTPATIENT
Start: 2024-01-16 | End: 2024-01-18

## 2024-01-16 RX ORDER — GLUCAGON INJECTION, SOLUTION 0.5 MG/.1ML
1 INJECTION, SOLUTION SUBCUTANEOUS ONCE
Refills: 0 | Status: DISCONTINUED | OUTPATIENT
Start: 2024-01-16 | End: 2024-01-22

## 2024-01-16 RX ORDER — ATORVASTATIN CALCIUM 80 MG/1
40 TABLET, FILM COATED ORAL AT BEDTIME
Refills: 0 | Status: DISCONTINUED | OUTPATIENT
Start: 2024-01-16 | End: 2024-01-22

## 2024-01-16 RX ORDER — HALOPERIDOL DECANOATE 100 MG/ML
5 INJECTION INTRAMUSCULAR ONCE
Refills: 0 | Status: COMPLETED | OUTPATIENT
Start: 2024-01-16 | End: 2024-01-16

## 2024-01-16 RX ORDER — ASPIRIN/CALCIUM CARB/MAGNESIUM 324 MG
162 TABLET ORAL ONCE
Refills: 0 | Status: COMPLETED | OUTPATIENT
Start: 2024-01-16 | End: 2024-01-16

## 2024-01-16 RX ORDER — METOPROLOL TARTRATE 50 MG
50 TABLET ORAL DAILY
Refills: 0 | Status: DISCONTINUED | OUTPATIENT
Start: 2024-01-16 | End: 2024-01-20

## 2024-01-16 RX ORDER — HEPARIN SODIUM 5000 [USP'U]/ML
3800 INJECTION INTRAVENOUS; SUBCUTANEOUS ONCE
Refills: 0 | Status: COMPLETED | OUTPATIENT
Start: 2024-01-16 | End: 2024-01-16

## 2024-01-16 RX ORDER — DEXTROSE 50 % IN WATER 50 %
25 SYRINGE (ML) INTRAVENOUS ONCE
Refills: 0 | Status: DISCONTINUED | OUTPATIENT
Start: 2024-01-16 | End: 2024-01-22

## 2024-01-16 RX ORDER — SODIUM CHLORIDE 9 MG/ML
1000 INJECTION, SOLUTION INTRAVENOUS
Refills: 0 | Status: DISCONTINUED | OUTPATIENT
Start: 2024-01-16 | End: 2024-01-17

## 2024-01-16 RX ORDER — TIMOLOL 0.5 %
1 DROPS OPHTHALMIC (EYE)
Refills: 0 | Status: DISCONTINUED | OUTPATIENT
Start: 2024-01-16 | End: 2024-01-22

## 2024-01-16 RX ORDER — INSULIN LISPRO 100/ML
VIAL (ML) SUBCUTANEOUS EVERY 6 HOURS
Refills: 0 | Status: DISCONTINUED | OUTPATIENT
Start: 2024-01-16 | End: 2024-01-17

## 2024-01-16 RX ORDER — DEXTROSE 50 % IN WATER 50 %
12.5 SYRINGE (ML) INTRAVENOUS ONCE
Refills: 0 | Status: DISCONTINUED | OUTPATIENT
Start: 2024-01-16 | End: 2024-01-22

## 2024-01-16 RX ORDER — ACETAMINOPHEN 500 MG
1000 TABLET ORAL ONCE
Refills: 0 | Status: COMPLETED | OUTPATIENT
Start: 2024-01-16 | End: 2024-01-16

## 2024-01-16 RX ORDER — CLOPIDOGREL BISULFATE 75 MG/1
75 TABLET, FILM COATED ORAL DAILY
Refills: 0 | Status: DISCONTINUED | OUTPATIENT
Start: 2024-01-17 | End: 2024-01-22

## 2024-01-16 RX ORDER — CLOPIDOGREL BISULFATE 75 MG/1
600 TABLET, FILM COATED ORAL ONCE
Refills: 0 | Status: COMPLETED | OUTPATIENT
Start: 2024-01-16 | End: 2024-01-16

## 2024-01-16 RX ADMIN — CLOPIDOGREL BISULFATE 600 MILLIGRAM(S): 75 TABLET, FILM COATED ORAL at 14:53

## 2024-01-16 RX ADMIN — Medication 1 MILLIGRAM(S): at 19:25

## 2024-01-16 RX ADMIN — HEPARIN SODIUM 750 UNIT(S)/HR: 5000 INJECTION INTRAVENOUS; SUBCUTANEOUS at 17:24

## 2024-01-16 RX ADMIN — Medication 400 MILLIGRAM(S): at 14:21

## 2024-01-16 RX ADMIN — Medication 1 MILLIGRAM(S): at 19:36

## 2024-01-16 RX ADMIN — ONDANSETRON 4 MILLIGRAM(S): 8 TABLET, FILM COATED ORAL at 14:21

## 2024-01-16 RX ADMIN — HEPARIN SODIUM 3800 UNIT(S): 5000 INJECTION INTRAVENOUS; SUBCUTANEOUS at 17:25

## 2024-01-16 RX ADMIN — HALOPERIDOL DECANOATE 5 MILLIGRAM(S): 100 INJECTION INTRAMUSCULAR at 19:35

## 2024-01-16 RX ADMIN — Medication 162 MILLIGRAM(S): at 14:21

## 2024-01-16 RX ADMIN — SODIUM CHLORIDE 500 MILLILITER(S): 9 INJECTION INTRAMUSCULAR; INTRAVENOUS; SUBCUTANEOUS at 21:38

## 2024-01-16 RX ADMIN — Medication 1000 MILLIGRAM(S): at 16:27

## 2024-01-16 RX ADMIN — Medication 1 MILLIGRAM(S): at 19:20

## 2024-01-16 NOTE — H&P ADULT - PROBLEM SELECTOR PLAN 5
SBP dropped to 90s s/p sedation. Likely sedation effect and also noted to have multiple vomiting episodes prior to arrival per niece. Dry lips on exam.. Patient repositioned in bed and repeat 108/63. 500cc bolus as well.  -hold losartan, HCTZ  -c/w metoprolol with hold parameters

## 2024-01-16 NOTE — H&P ADULT - NSICDXPASTSURGICALHX_GEN_ALL_CORE_FT
PAST SURGICAL HISTORY:  AICD (automatic cardioverter/defibrillator) present Medtronic    Cardiac Pacemaker,  Defibrillator 11/7/2009    Radium seed implants 12/2004    S/P Appendectomy 1962    S/P CABG X 4 1996    S/P colon resection     S/P Coronary Angiogram 1 stent placement 1998

## 2024-01-16 NOTE — ED ADULT TRIAGE NOTE - PAIN RATING/NUMBER SCALE (0-10): ACTIVITY
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 Pt complains of dysuria since surgery associated with suprapubic pressure. She has mild leakage. She also complains of a chronic right sided lower abdominal pain, dull and achy since surgery. States she always feels like something is there. Denies N/V or fever. Denies VB or vag d/c. She has mild constipation issues well controlled with Metamucil. Pain has no associated factors. \par \par 6/25/22- =7, CA 19-9=<2 \par \par Health maintenance:\par \par Mammo-7/2021-reports wnl \par Colonoscopy-never, had neg cologuard  \par DEXA-never\par

## 2024-01-16 NOTE — ED ADULT NURSE NOTE - OBJECTIVE STATEMENT
12:30-Pt presented to ER with c/o acute onset of pain to mid chest/epigastric region radiating to L shoulder and back associated with nausea, vomited x 1 while en route to ER, asymptomatic at this time, reports pacemaker beeping x 1 month, has appointment with cardiologist this week. In ER eval by Provider, labs obtained, family is at bedside, awaiting results. HINA Arriaga

## 2024-01-16 NOTE — ED PROVIDER NOTE - CLINICAL SUMMARY MEDICAL DECISION MAKING FREE TEXT BOX
83-year-old male, history of cardiomyopathy, s/p pacemaker in 2009, glaucoma, CVA X2 1993,  HTN, DM type 2, CABG X4 1995, cardiac stent X1 1998,  prostate cancer tx with radioactive seeds 2004, colon cancer 2015 tx with colon resection, presenting to the ED today for epigastric pain radiating to the left shoulder and back since 3 AM last night.  No other symptoms. Vital signs are within normal limits.  Patient is afebrile.  Physical exam is remarkable for tenderness in the epigastric area.  Lungs are clear to auscultation bilaterally.  Normal heart sounds.  Given patient extensive history, concerns for ACS.  Will order troponin and EKG.  Also concern for any intra-abdominal pathology, such as pancreatitis.  Will obtain CT scan of the abdomen.  Will also obtain basic labs to check for any electrolyte abnormalities. Chest x-ray to rule out pneumonia.  Dispo pending ED course.

## 2024-01-16 NOTE — ED ADULT NURSE REASSESSMENT NOTE - NS ED NURSE REASSESS COMMENT FT1
Break RN: Pt is A&Ox4, resting in stretcher with no complaints at this time. Respirations even and unlabored, chest rise equal b/l. VS as noted in flow sheets. Pt denies chest pain, SOB, fever, cough, chills, abdominal pain, N/V/D, h/a, dizziness, numbness/tingling or any urinary symptoms at this time. No acute distress noted. 22g IVL placed in left hand. Heparin infusion initiated as per order and verified with TRINIDAD Downey at bedside. Pt educated on heparin infusion precautions. Safety maintained throughout.

## 2024-01-16 NOTE — H&P ADULT - PROBLEM SELECTOR PLAN 1
Initially with epigastric pain, trop 99--158. EKG V-paced, no ERICKA or STD. Troponin elevation consistent with NSTEMI.  -tele  -echo  -cardiology consulted  -s/p ASA 162mg, c/w 81mg qd  -s/p plavix 600mg, c/w 75mg qd  -CTH negative for ICH, heparin gtt resumed by RN  -NPO @ MN for possible angiogram  -c/w metoprolol with hold parameters  -c/w statin   -trend troponins to peak Initially with epigastric pain, trop 99--158. EKG V-paced, no ERICKA or STD. Troponin elevation consistent with NSTEMI.  -tele  -echo  -cardiology consulted  -s/p ASA 162mg, c/w 81mg qd  -s/p plavix 600mg, c/w 75mg qd  -CTH negative for ICH, heparin gtt resumed by RN  -NPO @ MN for possible LHC depending on improvement of AMS. Discussed case with cardiology consult  -c/w metoprolol with hold parameters  -c/w statin   -trend troponins to peak Initially with epigastric pain, trop 99--158. EKG V-paced, no ERICKA or STD. Troponin elevation consistent with NSTEMI.  -tele  -echo  -cardiology consulted  -s/p ASA 162mg, c/w 81mg qd  -s/p plavix 600mg, c/w 75mg qd  -CTH negative for ICH, heparin gtt resumed by RN  -NPO @ MN for possible LHC depending on improvement of AMS. Discussed case with cardiology consult  -c/w metoprolol with hold parameters  -c/w statin   -trend troponins to peak. Repeat troponin 343

## 2024-01-16 NOTE — H&P ADULT - ASSESSMENT
83M with PMHx early dementia? hx cardiomyopathy, s/p PPM in 2009, glaucoma, CVA 1993? (ambulates with walker), HTN, DM2, CABG x4 1995, cardiac stent 1998, prostate cancer tx with radioactive seeds 2004, colon cancer tx with colon resection 2015 presenting with epigastric pain and chest pain since 3AM night prior to admission. Admitted with NSTEMI. Course also c/b by AMS

## 2024-01-16 NOTE — CONSULT NOTE ADULT - ASSESSMENT
Mr. Jacobs is a 83M with a PMH of ICM s/p MDT BiV AICD, CAD s/p CABG 95' and stents, HTN, T2DM, prostate cancer presenting with sudden onset chest pain concerning for acute coronary syndrome.    1. Chest Pain - interrogation no AICD shocks or arrhthymias suspect ACS   2. ICM s/p MDT BiV AICD  3. CAD s/p CABG and stents      RECOMMENDATIONS:  - Treat for ACS as per general cardiology  - Trend cardiac enzymes  - Obtain echocardiogram  - Will need generator change and downgrade to BiV PPM  - Resume GDMT  - Keep Mg > 2 and K > 4  - Monitor on telemetry    Note not final until signed by attending       Watson Crane MD  Cardiology Fellow PGY-6  Phone: [phone]    For all New Consults  [email or web address]   Login: [employer] Mr. Jacobs is a 83M with a PMH of ICM s/p MDT BiV AICD, CAD s/p CABG 95' and stents, HTN, T2DM, prostate cancer presenting with sudden onset chest pain concerning for acute coronary syndrome.    1. Chest Pain - interrogation no AICD shocks or arrhthymias suspect ACS   2. ICM s/p MDT BiV AICD  3. CAD s/p CABG and stents      RECOMMENDATIONS:  - Treat for ACS as per general cardiology  - Trend cardiac enzymes  - Obtain echocardiogram  - Will need generator change and downgrade to BiV PPM  - Resume GDMT  - Keep Mg > 2 and K > 4  - Monitor on telemetry    Note not final until signed by attending       Watson Crane MD  Cardiology Fellow PGY-6  Phone: [phone]    For all New Consults  [email or web address]   Login: [employer] Mr. Jacobs is a 83M with a PMH of ICM s/p MDT CRT-D, CAD s/p CABG 95' and stents, HTN, T2DM, prostate cancer presenting with sudden onset chest pain concerning for acute coronary syndrome.    1. Chest Pain - interrogation no AICD shocks or arrhthymias suspect ACS   2. ICM s/p MDT BiV AICD  3. CAD s/p CABG and stents      RECOMMENDATIONS:  - Treat for ACS as per general cardiology  - Trend cardiac enzymes  - Obtain echocardiogram  - Will need generator change and downgrade to BiV PPM  - Resume GDMT  - Keep Mg > 2 and K > 4  - Monitor on telemetry    Note not final until signed by attending       Watson Crane MD  Cardiology Fellow PGY-6  Phone: [phone]    For all New Consults  [email or web address]   Login: [employer] Mr. Jacobs is a 83M with a PMH of ICM s/p MDT CRT-D, CAD s/p CABG 95' and stents, HTN, T2DM, prostate cancer presenting with sudden onset chest pain concerning for acute coronary syndrome.    1. Chest Pain - interrogation no AICD shocks or arrhthymias suspect ACS   2. ICM s/p MDT BiV AICD  3. CAD s/p CABG and stents      RECOMMENDATIONS:  - Treat for ACS as per general cardiology  - Trend cardiac enzymes  - Obtain echocardiogram  - Will need generator change and downgrade to BiV PPM  - Resume GDMT  - Keep Mg > 2 and K > 4  - Monitor on telemetry    Note not final until signed by attending       Watson Crane MD  Cardiology Fellow PGY-6  Phone: [phone]    For all New Consults  [email or web address]   Login: "[employer]

## 2024-01-16 NOTE — H&P ADULT - NSHPPHYSICALEXAM_GEN_ALL_CORE
PHYSICAL EXAM:  Vital Signs Last 24 Hrs  T(C): 36.8 (01-16-24 @ 17:30)  T(F): 98.2 (01-16-24 @ 17:30), Max: 98.2 (01-16-24 @ 17:30)  HR: 76 (01-16-24 @ 19:57) (68 - 98)  BP: 108/63 (01-16-24 @ 20:37)  BP(mean): --  RR: 23 (01-16-24 @ 19:57) (18 - 23)  SpO2: 94% (01-16-24 @ 19:57) (94% - 100%)  Wt(kg): --    Constitutional: sedated, withdraws to painful stimuli in all extremities  Neck: Soft and supple , no thyromegaly   ENT: dry lips  Respiratory: Breath sounds are clear bilaterally, No wheezing, rales or rhonchi, no tachypnea, no accessory muscle use  Cardiovascular: S1 and S2, regular rate and rhythm, no Murmurs, gallops or rubs, no JVD, no leg edema  Gastrointestinal: Bowel Sounds present, soft, nontender, nondistended, no guarding, no rebound  Extremities: No cyanosis or clubbing; warm to touch  Vascular: 2+ peripheral pulses lower ex  Neurological  Sedated, unable to assess cranial nerves, not following commands  Withdraws all extremities to painful stimuli  Skin: No rashes, no ulcerations

## 2024-01-16 NOTE — H&P ADULT - PROBLEM SELECTOR PLAN 2
Acutely altered in ED and now s/p sedation and not answering questions. Was given 3mg ativan and 5mg haldol by ED. He is maintaining airway, satting % on 2L NC  Per niece has had intermittent confusion at baseline and suspected possible early signs of dementia. Possible vascular dementia given hx CVAs? Possible delirium in setting of acute illness  -dysphagia screen when more awake  -possible neuro c/s in AM if does not improve  -will need official dementia neurocognitive testing as outpatient  -CTH with Small vessel and atrophic changes.  -ativan 1mg IV PRN q6hrs for agitation, BH c/s in AM  -1:1 ordered

## 2024-01-16 NOTE — ED PROVIDER NOTE - PROGRESS NOTE DETAILS
Leydricah [place], DO (PGY1): cardio consulted. Will come see patient in ED Leydricah [place] (PGY1): Patient stable, updated regarding plans to admit for further workup. In agreement with plan. All questions answered.

## 2024-01-16 NOTE — ED PROVIDER NOTE - PHYSICAL EXAMINATION
GENERAL: no acute distress, non-toxic appearing  HEAD: normocephalic, atraumatic  HEENT: oral mucosa moist, full ROM of neck  CARDIAC: regular rate rhythm, normal S1/S2  CHEST: CTA BL, no wheeze or crackles  ABDOMEN: normal BS, soft, epigastric tenderness  EXTREMITY: no gross deformity, no edema, good perfusion   MSK: no visible deformities, no peripheral edema, calf tenderness/redness/swelling  NEURO: alert and orientedx3

## 2024-01-16 NOTE — H&P ADULT - HISTORY OF PRESENT ILLNESS
83M with PMHx early dementia? hx cardiomyopathy, s/p PPM in 2009, glaucoma, CVA 1993? (ambulates with walker), HTN, DM2, CABG x4 1995, cardiac stent 1998, prostate cancer tx with radioactive seeds 2004, colon cancer tx with colon resection 2015 presenting with epigastric pain and chest pain since 3AM night prior to admission. Upon my evaluation @ 8PM patient was previously given ativan 3mg and haldol 5mg for acute AMS while in ED and he is currently sedated and unable to provide history. The niece is at bedside and collateral obtained from prior ED provider notes, cards and EP consults. The patient had epigastric pain that radiated to the left shoulder and back since 3AM. He also had multiple episodes of vomiting on the way to the hospital per niece. He was scheduled to have his PPM generator replaced tomorrow with Dr. Alex Cruz. Per niece    In ED, troponins 99--158 and given plavix load, ASA load and started on heparin gtt  Prior to my evaluation the patient was reportedly altered in ED and per niece he was previously oriented x3 in ED and then became much more paranoid. He was oriented x1 and combative and would not answer the rest of orientation questions. He began speaking [place] and was paranoid of providers trying to take him to the CT scanner. He could not recognize his sister but was able to recognize his niece. Per niece, he lives with his wife in [facility] and has more assistance with his ADLs over time. He sometimes gets confused off and on and has illogical/tangential speech with family. Otherwise he is usually oriented x3, ambulates with a walker and eats a regular diet. There have been some concern for possible early dementia in him. Today he was much more agitated than he's ever been however.

## 2024-01-16 NOTE — CHART NOTE - NSCHARTNOTEFT_GEN_A_CORE
At approximately 19:05 providers were called to bedside by patient's sister who reported patient seemed confused. Patient was attempting to exit bed and was acutely agitated, cursing at his sister, speaking only in Bulgarian, cursing at and swinging at this provider, trying to get up out of bed and leave. On reassessment, patient was AAOx3 in Bulgarian, but did not recognize this provider despite extensive conversations in fluent English throughout the day. Attempted to contact MAR however MAR was unavailable as a rapid response was in progress elsewhere in the hospital. Patient was unable to be redirected verbally by providers, patient's sister, AND patient's niece in neither English nor Bulgarian - attempted verbal deescalation and redirection with niece and sister at bedside providing translation. 1mg ativan IV given without response, additional 2mg IV ativan given with patient able to be redirected to bed. Patient on heparin drip due to concern for NSTEMI, also s/p plavix load; heparin dripped stopped given acute change in mental status and STAT CT head ordered. In CT scanner patient stood up from bed, still only speaking Bulgarian, and refused to lie down for CT scan or get back into bed. Unable to be redirected verbally by myself, RN, or niece speaking Bulgarian at patient's side. Patient still combative and swinging on providers at this time; 5mg IV haldol given around approximately 19:30 due to concern for patient's safety and safety of staff as pt was upright and a fall risk at that time. Stat CT head ordered and cardiology team/admitting team notified of events. - Daisy Salomon, PGY-3 At approximately 19:05 providers were called to bedside by patient's sister who reported patient seemed confused. Patient was attempting to exit bed and was acutely agitated, cursing at his sister, speaking only in Danish, cursing at and swinging at this provider, trying to get up out of bed and leave. On reassessment, patient was AAOx3 in Danish, but did not recognize this provider despite extensive conversations in fluent English throughout the day. Attempted to contact MAR however MAR was unavailable as a rapid response was in progress elsewhere in the hospital. Patient was unable to be redirected verbally by providers, patient's sister, AND patient's niece in neither English nor Danish - attempted verbal deescalation and redirection with niece and sister at bedside providing translation. 1mg ativan IV given without response, additional 2mg IV ativan given with patient able to be redirected to bed. Patient on heparin drip due to concern for NSTEMI, also s/p plavix load; heparin gtt STOPPED given acute change in mental status and STAT CT head ordered. In CT scanner patient stood up from bed, still only speaking Danish, and refused to lie down for CT scan or get back into bed. Unable to be redirected verbally by myself, RN, or niece speaking Danish at patient's side. Patient still combative and swinging on providers at this time; 5mg IV haldol given around approximately 19:30 due to concern for patient's safety and safety of staff as pt was upright and a fall risk at that time. Stat CT head ordered and cardiology team/admitting team notified of events. - Daisy Salomon, PGY-3

## 2024-01-16 NOTE — ED PROVIDER NOTE - ATTENDING CONTRIBUTION TO CARE
Mahendrag: I have seen and examined the patient face to face, have reviewed and addended the HPI, PE and a/p as necessary.     82 yo M with cardiomyopathy, s/p pacemaker in 2009, glaucoma, CVA X2 1993,  HTN, DM type 2, CABG X4 1995, cardiac stent X1 1998,  prostate cancer tx with radioactive seeds 2004, colon cancer 2015 tx with colon resection, a/w chest pain, epigastric pain radiating to the left shoulder and back since 3 AM last night.  Patient called his cardiologist, who recommended coming to the emergency department. Noted to be pending pacemaker replaced tomorrow.      No fever, nausea, vomiting, diarrhea, constipation, headache, shortness of breath, or urinary symptoms.      No smoking or other drug use.    EP is Dr. Alex Cruz.    GEN - NAD; well appearing; A+O x3; non-toxic appearing  CARD -s1s2, RRR, no M,G,R;   PULM - CTA b/l, symmetric breath sounds;   ABD -  +BS, TTP in midepigastric, soft, no guarding, no rebound, no masses;   BACK - no CVA tenderness, Normal  spine;   EXT - symmetric pulses, 2+ dp, capillary refill < 2 seconds, no cyanosis, no edema;   NEURO - no focal neuro deficits, no slurred speech    Concern for possible ACS, iniital trop noted to be elevated concerning for NSTEMI.  Will obtain ct abd pelvis to r/o acute abdominal pathology.  EKG V-paced unchanged.  Will follow up cbc, cmp, trop (repeat) and Frequent re-eval.  Heparin gtt for NSTEMI

## 2024-01-16 NOTE — CONSULT NOTE ADULT - ATTENDING COMMENTS
NSTEMI, for cath.
Mr. Jacobs is a 83M with a PMH of ICM s/p MDT CRT-D, CAD s/p CABG 95' and stents, HTN, T2DM, prostate cancer presenting with sudden onset chest pain concerning for acute coronary syndrome. Obtain echocardiogram.  Will need generator change and downgrade to BiV PPM. Resume GDMT. Patient to undergo angiogram.  Also needs evaluation for acute mental status changes for which he received 3 mg ativan.

## 2024-01-16 NOTE — H&P ADULT - NSICDXPASTMEDICALHX_GEN_ALL_CORE_FT
PAST MEDICAL HISTORY:  ACID/Pacer Med[employer]onic model # [id number]    Acute Myocardial Infarction (ICD9 410.90) 10/93 and 2/96    Back Pain     BPH (Benign Prostatic Hyperplasia)     CAD (Coronary Artery Disease) (ICD9 414.00)     Colon cancer     CVA (cerebrovascular accident)     Diabetes Mellitus Type 2 in Nonobese (ICD9 250.00)     Glaucoma     History of Prostate Cancer (ICD9 V10.46) S/P seed implant 2004    HTN (Hypertension) (ICD9 401.9)     Hypercholesteremia     Left Ventricular Dysfunction (ICD9 428.1) h/o    Legal Blindness,  right eye right eye    Unspecified systolic (congestive) heart failure

## 2024-01-16 NOTE — ED ADULT TRIAGE NOTE - CHIEF COMPLAINT QUOTE
pt c/o left sided chest pain and shoulder pain since this am.  pt is scheduled to have pacemaker "repair" tomorrow.  Hx:  DM, HTN,

## 2024-01-16 NOTE — PROCEDURE NOTE - ADDITIONAL PROCEDURE DETAILS
Interrogation for chest pain:    No arrhythmias or VT/VF recorded; no shocks delivered  Battery at ROLY    Will need a generator change

## 2024-01-16 NOTE — H&P ADULT - TIME BILLING
I had a face to face encounter with this patient. I spent 79 total minutes on the bedside interview and examination, coordination of care, counseling, chart review, order placement and documentation for this patient.

## 2024-01-16 NOTE — CONSULT NOTE ADULT - SUBJECTIVE AND OBJECTIVE BOX
Date of Admission:    Patient is a 83y old  Male who presents with a chief complaint of     HISTORY OF PRESENT ILLNESS:   Mr. Jacobs is a 83M with a PMH of ICM s/p MDT BiV AICD, CAD s/p CABG 95' and stents, HTN, T2DM, prostate cancer presenting with sudden onset chest pain. Patient reports he had chest pain at ~2:30AM that woke him up from his sleep. Describes it as a pressure sensation from epigastric region radiating up to his chest and left shoulder. Chest pain was constant at first then intermittent. Not similar to his prior chest pain episodes. No alleviating or exacerbating factors. No prior history of AICD shocks. Given he continued to have chest pain he came to the ED.    Of note patient recently saw EP Dr. Cruz with interrogation revealing ROLY with plan for downgrade to BiV PPM and generator change. Interrogation at that time revealed high LV threshold 2.125V.     Currently patient is not having chest pain, shortness of breath, palpitations, LH, dizziness.    In the ED:  Afebrile HR 73 /85 RR 18 99% SpO2 on RA  HsT 99    EKG shows A-sensed V-paced rhythm 69 with PVCs     ICD interrogation: no VT/VF episodes or arrhythmias underlying rhythm sinus bradycardia 40-50s     Allergies    penicillin (Rash; Swelling)  Ambien (Other)    Intolerances    	    MEDICATIONS:                  PAST MEDICAL & SURGICAL HISTORY:  CAD (Coronary Artery Disease) (ICD9 414.00)      Acute Myocardial Infarction (ICD9 410.90)  10/93 and 2/96      Diabetes Mellitus Type 2 in Nonobese (ICD9 250.00)      History of Prostate Cancer (ICD9 V10.46)  S/P seed implant 2004      HTN (Hypertension) (ICD9 401.9)      Legal Blindness,  right eye  right eye      Left Ventricular Dysfunction (ICD9 428.1)  h/o      ACID/Pacer  Medtronic model # [id number]      BPH (Benign Prostatic Hyperplasia)      Back Pain      Hypercholesteremia      Unspecified systolic (congestive) heart failure      Glaucoma      CVA (cerebrovascular accident)      Colon cancer      S/P Appendectomy  1962      S/P CABG X 4  1996      S/P Coronary Angiogram  1 stent placement 1998      Radium seed implants  12/2004      Cardiac Pacemaker,  Defibrillator  11/7/2009      AICD (automatic cardioverter/defibrillator) present  Medtronic      S/P colon resection          FAMILY HISTORY:  No pertinent family history in first degree relatives        SOCIAL HISTORY:    [X ] Non-smoker  [ ] Smoker  [ ] Alcohol      REVIEW OF SYSTEMS:    CONSTITUTIONAL: No weakness, fevers or chills  EYES/ENT: No visual changes;  No dysphagia  NECK: No pain or stiffness  RESPIRATORY: No cough, wheezing, hemoptysis; No shortness of breath  CARDIOVASCULAR: No palpitations; No lower extremity edema  GASTROINTESTINAL: No abdominal or epigastric pain. No nausea, vomiting, or hematemesis; No diarrhea or constipation. No melena or hematochezia.  BACK: No back pain  GENITOURINARY: No dysuria, frequency or hematuria  NEUROLOGICAL: No numbness or weakness  SKIN: No itching, burning, rashes, or lesions   All other review of systems is negative unless indicated above.  PHYSICAL EXAM:  T(C): 36.6 (01-16-24 @ 11:42), Max: 36.6 (01-16-24 @ 11:42)  HR: 73 (01-16-24 @ 11:42) (73 - 73)  BP: 139/85 (01-16-24 @ 11:42) (139/85 - 139/85)  RR: 18 (01-16-24 @ 11:42) (18 - 18)  SpO2: 99% (01-16-24 @ 11:42) (99% - 99%)  Wt(kg): --  I&O's Summary      Appearance: Normal	  HEENT:   Normal oral mucosa, PERRL, EOMI	  Lymphatic: No lymphadenopathy  Cardiovascular: Normal S1 S2, No JVD, No murmurs, No edema AICD site C/D/I  Respiratory: Lungs clear to auscultation	  Psychiatry: A & O x 3, Mood & affect appropriate  Gastrointestinal:  Soft, Non-tender, + BS	  Skin: No rashes, No ecchymoses, No cyanosis	  Neurologic: Non-focal  Extremities: Normal range of motion, No clubbing, cyanosis or edema  Vascular: Peripheral pulses palpable 2+ bilaterally        LABS:	 	    CBC Full  -  ( 16 Jan 2024 12:49 )  WBC Count : 7.91 K/uL  Hemoglobin : 15.5 g/dL  Hematocrit : 44.7 %  Platelet Count - Automated : 261 K/uL  Mean Cell Volume : 91.6 fL  Mean Cell Hemoglobin : 31.8 pg  Mean Cell Hemoglobin Concentration : 34.7 gm/dL  Auto Neutrophil # : 6.39 K/uL  Auto Lymphocyte # : 1.00 K/uL  Auto Monocyte # : 0.42 K/uL  Auto Eosinophil # : 0.02 K/uL  Auto Basophil # : 0.05 K/uL  Auto Neutrophil % : 80.8 %  Auto Lymphocyte % : 12.6 %  Auto Monocyte % : 5.3 %  Auto Eosinophil % : 0.3 %  Auto Basophil % : 0.6 %    01-16    131<L>  |  93<L>  |  14  ----------------------------<  177<H>  4.5   |  27  |  1.09    Ca    9.6      16 Jan 2024 12:49  Phos  2.2     01-16  Mg     1.80     01-16    TPro  7.2  /  Alb  4.2  /  TBili  0.9  /  DBili  x   /  AST  26  /  ALT  10  /  AlkPhos  61  01-16      proBNP:   Lipid Profile:   HgA1c:   TSH:       CARDIAC MARKERS:    HsT 99            	    	           Date of Admission:    Patient is a 83y old  Male who presents with a chief complaint of     HISTORY OF PRESENT ILLNESS:   Mr. Jacobs is a 83M with a PMH of ICM s/p MDT BiV AICD, CAD s/p CABG 95' and stents, HTN, T2DM, prostate cancer presenting with sudden onset chest pain. Patient reports he had chest pain at ~2:30AM that woke him up from his sleep. Describes it as a pressure sensation from epigastric region radiating up to his chest and left shoulder. Chest pain was constant at first then intermittent. Not similar to his prior chest pain episodes. No alleviating or exacerbating factors. No prior history of AICD shocks. Given he continued to have chest pain he came to the ED.    Of note patient recently saw EP Dr. Cruz with interrogation revealing ROLY with plan for downgrade to BiV PPM and generator change. Interrogation at that time revealed high LV threshold 2.125V.     Currently patient is not having chest pain, shortness of breath, palpitations, LH, dizziness.    In the ED:  Afebrile HR 73 /85 RR 18 99% SpO2 on RA  HsT 99    EKG shows A-sensed V-paced rhythm 69 with PVCs     ICD interrogation: no VT/VF episodes or arrhythmias underlying rhythm sinus bradycardia 40-50s     Allergies    penicillin (Rash; Swelling)  Ambien (Other)    Intolerances    	    MEDICATIONS:                  PAST MEDICAL & SURGICAL HISTORY:  CAD (Coronary Artery Disease) (ICD9 414.00)      Acute Myocardial Infarction (ICD9 410.90)  10/93 and 2/96      Diabetes Mellitus Type 2 in Nonobese (ICD9 250.00)      History of Prostate Cancer (ICD9 V10.46)  S/P seed implant 2004      HTN (Hypertension) (ICD9 401.9)      Legal Blindness,  right eye  right eye      Left Ventricular Dysfunction (ICD9 428.1)  h/o      ACID/Pacer  Medtronic model # [id number]      BPH (Benign Prostatic Hyperplasia)      Back Pain      Hypercholesteremia      Unspecified systolic (congestive) heart failure      Glaucoma      CVA (cerebrovascular accident)      Colon cancer      S/P Appendectomy  1962      S/P CABG X 4  1996      S/P Coronary Angiogram  1 stent placement 1998      Radium seed implants  12/2004      Cardiac Pacemaker,  Defibrillator  11/7/2009      AICD (automatic cardioverter/defibrillator) present  Medtronic      S/P colon resection          FAMILY HISTORY:  No pertinent family history in first degree relatives        SOCIAL HISTORY:    [X ] Non-smoker  [ ] Smoker  [ ] Alcohol      REVIEW OF SYSTEMS:    CONSTITUTIONAL: No weakness, fevers or chills  EYES/ENT: No visual changes;  No dysphagia  NECK: No pain or stiffness  RESPIRATORY: No cough, wheezing, hemoptysis; No shortness of breath  CARDIOVASCULAR: No palpitations; No lower extremity edema  GASTROINTESTINAL: No abdominal or epigastric pain. No nausea, vomiting, or hematemesis; No diarrhea or constipation. No melena or hematochezia.  BACK: No back pain  GENITOURINARY: No dysuria, frequency or hematuria  NEUROLOGICAL: No numbness or weakness  SKIN: No itching, burning, rashes, or lesions   All other review of systems is negative unless indicated above.  PHYSICAL EXAM:  T(C): 36.6 (01-16-24 @ 11:42), Max: 36.6 (01-16-24 @ 11:42)  HR: 73 (01-16-24 @ 11:42) (73 - 73)  BP: 139/85 (01-16-24 @ 11:42) (139/85 - 139/85)  RR: 18 (01-16-24 @ 11:42) (18 - 18)  SpO2: 99% (01-16-24 @ 11:42) (99% - 99%)  Wt(kg): --  I&O's Summary      Appearance: Normal	  HEENT:   Normal oral mucosa, PERRL, EOMI	  Lymphatic: No lymphadenopathy  Cardiovascular: Normal S1 S2, No JVD, No murmurs, No edema AICD site C/D/I  Respiratory: Lungs clear to auscultation	  Psychiatry: A & O x 3, Mood & affect appropriate  Gastrointestinal:  Soft, Non-tender, + BS	  Skin: No rashes, No ecchymoses, No cyanosis	  Neurologic: Non-focal  Extremities: Normal range of motion, No clubbing, cyanosis or edema  Vascular: Peripheral pulses palpable 2+ bilaterally        LABS:	 	    CBC Full  -  ( 16 Jan 2024 12:49 )  WBC Count : 7.91 K/uL  Hemoglobin : 15.5 g/dL  Hematocrit : 44.7 %  Platelet Count - Automated : 261 K/uL  Mean Cell Volume : 91.6 fL  Mean Cell Hemoglobin : 31.8 pg  Mean Cell Hemoglobin Concentration : 34.7 gm/dL  Auto Neutrophil # : 6.39 K/uL  Auto Lymphocyte # : 1.00 K/uL  Auto Monocyte # : 0.42 K/uL  Auto Eosinophil # : 0.02 K/uL  Auto Basophil # : 0.05 K/uL  Auto Neutrophil % : 80.8 %  Auto Lymphocyte % : 12.6 %  Auto Monocyte % : 5.3 %  Auto Eosinophil % : 0.3 %  Auto Basophil % : 0.6 %    01-16    131<L>  |  93<L>  |  14  ----------------------------<  177<H>  4.5   |  27  |  1.09    Ca    9.6      16 Jan 2024 12:49  Phos  2.2     01-16  Mg     1.80     01-16    TPro  7.2  /  Alb  4.2  /  TBili  0.9  /  DBili  x   /  AST  26  /  ALT  10  /  AlkPhos  61  01-16      proBNP:   Lipid Profile:   HgA1c:   TSH:       CARDIAC MARKERS:    HsT 99            	    	           Date of Admission:    Patient is a 83y old  Male who presents with a chief complaint of     HISTORY OF PRESENT ILLNESS:   Mr. Jacobs is a 83M with a PMH of ICM s/p MDT CRT-D, CAD s/p CABG 95' and stents, HTN, T2DM, prostate cancer presenting with sudden onset chest pain. Patient reports he had chest pain at ~2:30AM that woke him up from his sleep. Describes it as a pressure sensation from epigastric region radiating up to his chest and left shoulder. Chest pain was constant at first then intermittent. Not similar to his prior chest pain episodes. No alleviating or exacerbating factors. No prior history of AICD shocks. Given he continued to have chest pain he came to the ED.    Of note patient recently saw EP Dr. Cruz with interrogation revealing ROLY with plan for downgrade to BiV PPM and generator change. Interrogation at that time revealed high LV threshold 2.125V.     Currently patient is not having chest pain, shortness of breath, palpitations, LH, dizziness.    In the ED:  Afebrile HR 73 /85 RR 18 99% SpO2 on RA  HsT 99    EKG shows A-sensed V-paced rhythm 69 with PVCs     ICD interrogation: no VT/VF episodes or arrhythmias underlying rhythm sinus bradycardia 40-50s     Allergies    penicillin (Rash; Swelling)  Ambien (Other)    Intolerances    	    MEDICATIONS:                  PAST MEDICAL & SURGICAL HISTORY:  CAD (Coronary Artery Disease) (ICD9 414.00)      Acute Myocardial Infarction (ICD9 410.90)  10/93 and 2/96      Diabetes Mellitus Type 2 in Nonobese (ICD9 250.00)      History of Prostate Cancer (ICD9 V10.46)  S/P seed implant 2004      HTN (Hypertension) (ICD9 401.9)      Legal Blindness,  right eye  right eye      Left Ventricular Dysfunction (ICD9 428.1)  h/o      ACID/Pacer  Medtronic model # [id number]      BPH (Benign Prostatic Hyperplasia)      Back Pain      Hypercholesteremia      Unspecified systolic (congestive) heart failure      Glaucoma      CVA (cerebrovascular accident)      Colon cancer      S/P Appendectomy  1962      S/P CABG X 4  1996      S/P Coronary Angiogram  1 stent placement 1998      Radium seed implants  12/2004      Cardiac Pacemaker,  Defibrillator  11/7/2009      AICD (automatic cardioverter/defibrillator) present  Medtronic      S/P colon resection          FAMILY HISTORY:  No pertinent family history in first degree relatives        SOCIAL HISTORY:    [X ] Non-smoker  [ ] Smoker  [ ] Alcohol      REVIEW OF SYSTEMS:    CONSTITUTIONAL: No weakness, fevers or chills  EYES/ENT: No visual changes;  No dysphagia  NECK: No pain or stiffness  RESPIRATORY: No cough, wheezing, hemoptysis; No shortness of breath  CARDIOVASCULAR: No palpitations; No lower extremity edema  GASTROINTESTINAL: No abdominal or epigastric pain. No nausea, vomiting, or hematemesis; No diarrhea or constipation. No melena or hematochezia.  BACK: No back pain  GENITOURINARY: No dysuria, frequency or hematuria  NEUROLOGICAL: No numbness or weakness  SKIN: No itching, burning, rashes, or lesions   All other review of systems is negative unless indicated above.  PHYSICAL EXAM:  T(C): 36.6 (01-16-24 @ 11:42), Max: 36.6 (01-16-24 @ 11:42)  HR: 73 (01-16-24 @ 11:42) (73 - 73)  BP: 139/85 (01-16-24 @ 11:42) (139/85 - 139/85)  RR: 18 (01-16-24 @ 11:42) (18 - 18)  SpO2: 99% (01-16-24 @ 11:42) (99% - 99%)  Wt(kg): --  I&O's Summary      Appearance: Normal	  HEENT:   Normal oral mucosa, PERRL, EOMI	  Lymphatic: No lymphadenopathy  Cardiovascular: Normal S1 S2, No JVD, No murmurs, No edema AICD site C/D/I  Respiratory: Lungs clear to auscultation	  Psychiatry: A & O x 3, Mood & affect appropriate  Gastrointestinal:  Soft, Non-tender, + BS	  Skin: No rashes, No ecchymoses, No cyanosis	  Neurologic: Non-focal  Extremities: Normal range of motion, No clubbing, cyanosis or edema  Vascular: Peripheral pulses palpable 2+ bilaterally        LABS:	 	    CBC Full  -  ( 16 Jan 2024 12:49 )  WBC Count : 7.91 K/uL  Hemoglobin : 15.5 g/dL  Hematocrit : 44.7 %  Platelet Count - Automated : 261 K/uL  Mean Cell Volume : 91.6 fL  Mean Cell Hemoglobin : 31.8 pg  Mean Cell Hemoglobin Concentration : 34.7 gm/dL  Auto Neutrophil # : 6.39 K/uL  Auto Lymphocyte # : 1.00 K/uL  Auto Monocyte # : 0.42 K/uL  Auto Eosinophil # : 0.02 K/uL  Auto Basophil # : 0.05 K/uL  Auto Neutrophil % : 80.8 %  Auto Lymphocyte % : 12.6 %  Auto Monocyte % : 5.3 %  Auto Eosinophil % : 0.3 %  Auto Basophil % : 0.6 %    01-16    131<L>  |  93<L>  |  14  ----------------------------<  177<H>  4.5   |  27  |  1.09    Ca    9.6      16 Jan 2024 12:49  Phos  2.2     01-16  Mg     1.80     01-16    TPro  7.2  /  Alb  4.2  /  TBili  0.9  /  DBili  x   /  AST  26  /  ALT  10  /  AlkPhos  61  01-16      proBNP:   Lipid Profile:   HgA1c:   TSH:       CARDIAC MARKERS:    HsT 99            	    	           Date of Admission:    Patient is a 83y old  Male who presents with a chief complaint of     HISTORY OF PRESENT ILLNESS:   Mr. Jacobs is a 83M with a PMH of ICM s/p MDT CRT-D, CAD s/p CABG 95' and stents, HTN, T2DM, prostate cancer presenting with sudden onset chest pain. Patient reports he had chest pain at ~2:30AM that woke him up from his sleep. Describes it as a pressure sensation from epigastric region radiating up to his chest and left shoulder. Chest pain was constant at first then intermittent. Not similar to his prior chest pain episodes. No alleviating or exacerbating factors. No prior history of AICD shocks. Given he continued to have chest pain he came to the ED.    Of note patient recently saw EP Dr. Cruz with interrogation revealing ROLY with plan for downgrade to BiV PPM and generator change. Interrogation at that time revealed high LV threshold 2.125V.     Currently patient is not having chest pain, shortness of breath, palpitations, LH, dizziness.    In the ED:  Afebrile HR 73 /85 RR 18 99% SpO2 on RA  HsT 99    EKG shows A-sensed V-paced rhythm 69 with PVCs     ICD interrogation: no VT/VF episodes or arrhythmias underlying rhythm sinus bradycardia 40-50s     Allergies    penicillin (Rash; Swelling)  Ambien (Other)    Intolerances    	    MEDICATIONS:                  PAST MEDICAL & SURGICAL HISTORY:  CAD (Coronary Artery Disease) (ICD9 414.00)      Acute Myocardial Infarction (ICD9 410.90)  10/93 and 2/96      Diabetes Mellitus Type 2 in Nonobese (ICD9 250.00)      History of Prostate Cancer (ICD9 V10.46)  S/P seed implant 2004      HTN (Hypertension) (ICD9 401.9)      Legal Blindness,  right eye  right eye      Left Ventricular Dysfunction (ICD9 428.1)  h/o      ACID/Pacer  Medtronic model # [id number]      BPH (Benign Prostatic Hyperplasia)      Back Pain      Hypercholesteremia      Unspecified systolic (congestive) heart failure      Glaucoma      CVA (cerebrovascular accident)      Colon cancer      S/P Appendectomy  1962      S/P CABG X 4  1996      S/P Coronary Angiogram  1 stent placement 1998      Radium seed implants  12/2004      Cardiac Pacemaker,  Defibrillator  11/7/2009      AICD (automatic cardioverter/defibrillator) present  Medtronic      S/P colon resection          FAMILY HISTORY:  No pertinent family history in first degree relatives        SOCIAL HISTORY:    [X ] Non-smoker  [ ] Smoker  [ ] Alcohol      REVIEW OF SYSTEMS:    CONSTITUTIONAL: No weakness, fevers or chills  EYES/ENT: No visual changes;  No dysphagia  NECK: No pain or stiffness  RESPIRATORY: No cough, wheezing, hemoptysis; No shortness of breath  CARDIOVASCULAR: No palpitations; No lower extremity edema  GASTROINTESTINAL: No abdominal or epigastric pain. No nausea, vomiting, or hematemesis; No diarrhea or constipation. No melena or hematochezia.  BACK: No back pain  GENITOURINARY: No dysuria, frequency or hematuria  NEUROLOGICAL: No numbness or weakness  SKIN: No itching, burning, rashes, or lesions   All other review of systems is negative unless indicated above.  PHYSICAL EXAM:  T(C): 36.6 (01-16-24 @ 11:42), Max: 36.6 (01-16-24 @ 11:42)  HR: 73 (01-16-24 @ 11:42) (73 - 73)  BP: 139/85 (01-16-24 @ 11:42) (139/85 - 139/85)  RR: 18 (01-16-24 @ 11:42) (18 - 18)  SpO2: 99% (01-16-24 @ 11:42) (99% - 99%)  Wt(kg): --  I&O's Summary      Appearance: Normal	  HEENT:   Normal oral mucosa, PERRL, EOMI	  Lymphatic: No lymphadenopathy  Cardiovascular: Normal S1 S2, No JVD, No murmurs, No edema AICD site C/D/I  Respiratory: Lungs clear to auscultation	  Psychiatry: A & O x 3, Mood & affect appropriate  Gastrointestinal:  Soft, Non-tender, + BS	  Skin: No rashes, No ecchymoses, No cyanosis	  Neurologic: Non-focal  Extremities: Normal range of motion, No clubbing, cyanosis or edema  Vascular: Peripheral pulses palpable 2+ bilaterally        LABS:	 	    CBC Full  -  ( 16 Jan 2024 12:49 )  WBC Count : 7.91 K/uL  Hemoglobin : 15.5 g/dL  Hematocrit : 44.7 %  Platelet Count - Automated : 261 K/uL  Mean Cell Volume : 91.6 fL  Mean Cell Hemoglobin : 31.8 pg  Mean Cell Hemoglobin Concentration : 34.7 gm/dL  Auto Neutrophil # : 6.39 K/uL  Auto Lymphocyte # : 1.00 K/uL  Auto Monocyte # : 0.42 K/uL  Auto Eosinophil # : 0.02 K/uL  Auto Basophil # : 0.05 K/uL  Auto Neutrophil % : 80.8 %  Auto Lymphocyte % : 12.6 %  Auto Monocyte % : 5.3 %  Auto Eosinophil % : 0.3 %  Auto Basophil % : 0.6 %    01-16    131<L>  |  93<L>  |  14  ----------------------------<  177<H>  4.5   |  27  |  1.09    Ca    9.6      16 Jan 2024 12:49  Phos  2.2     01-16  Mg     1.80     01-16    TPro  7.2  /  Alb  4.2  /  TBili  0.9  /  DBili  x   /  AST  26  /  ALT  10  /  AlkPhos  61  01-16      proBNP:   Lipid Profile:   HgA1c:   TSH:       CARDIAC MARKERS:    HsT 99

## 2024-01-16 NOTE — CONSULT NOTE ADULT - SUBJECTIVE AND OBJECTIVE BOX
Mata Tavarez MD  Cardiology Fellow  [phone]  All Cardiology service information can be found 24/7 on [email or web address], password: [email or web address]    Patient seen and evaluated at bedside    HPI:  83M with PMH of ICM s/p MDT CRT-D, CAD s/p CABG 95' and stents, HTN, T2DM, prostate cancer presenting with sudden onset chest pain starting around 2AM waking him up from sleep, epigastric and radiation to the shoulder. Never had this before. Mostly improved on arrival to the ED. Initial EKG w/ paced rhythm. Labs significant for trop 99 -> 158. Patient loaded with ASA, plavix, started on heparin gtt.     Cardiac Meds: ASA, plavix 75, metop succ 50, hctz 25, losart 50    PMHx:   CAD (Coronary Artery Disease) (ICD9 414.00)    Acute Myocardial Infarction (ICD9 410.90)    Diabetes Mellitus Type 2 in Nonobese (ICD9 250.00)    History of Prostate Cancer (ICD9 V10.46)    HTN (Hypertension) (ICD9 401.9)    Legal Blindness,  right eye    Left Ventricular Dysfunction (ICD9 428.1)    Cholecystitis, Unspecified (ICD9 575.10)    S/P CABG X 4 (ICD9 V45.81)    Coronary Stent (ICD9 V45.82)    History of Appendectomy (ICD9 V45.89)    ACID/Pacer    BPH (Benign Prostatic Hyperplasia)    DM (Diabetes Mellitus)    Migraines    Prostate Ca    Back Pain    Hypercholesteremia    Seasonal Allergies    Unspecified systolic (congestive) heart failure    Glaucoma    CVA (cerebrovascular accident)    Colon cancer        PSHx:   S/P Appendectomy    S/P CABG X 4    S/P Coronary Angiogram    Radium seed implants    Cardiac Pacemaker,  Defibrillator    AICD (automatic cardioverter/defibrillator) present    S/P colon resection        Allergies:  penicillin (Rash; Swelling)  Ambien (Other)      Current Medications:   heparin   Injectable 3800 Unit(s) IV Push every 6 hours PRN  heparin  Infusion.  Unit(s)/Hr IV Continuous <Continuous>  LORazepam   Injectable 1 milliGRAM(s) IV Push once PRN      FAMILY HISTORY:  No pertinent family history in first degree relatives    REVIEW OF SYSTEMS:  CONSTITUTIONAL: No weakness, fevers or chills  EYES/ENT: No visual changes;  No dysphagia  NECK: No pain or stiffness  RESPIRATORY: No cough, wheezing, hemoptysis; No shortness of breath  CARDIOVASCULAR: No chest pain or palpitations; No lower extremity edema  GASTROINTESTINAL: No abdominal or epigastric pain. No nausea, vomiting, or hematemesis; No diarrhea or constipation. No melena or hematochezia.  BACK: No back pain  GENITOURINARY: No dysuria, frequency or hematuria  NEUROLOGICAL: No numbness or weakness  SKIN: No itching, burning, rashes, or lesions   All other review of systems is negative unless indicated above.    Physical Exam:  T(F): 98.2 (01-16), Max: 98.2 (01-16)  HR: 76 (01-16) (68 - 98)  BP: 94/54 (01-16) (94/54 - 158/83)  RR: 23 (01-16)  SpO2: 94% (01-16)  GEN: NAD  HEENT: EOMI, clear sclera  PULM: CTA b/l, no wheeze  CV: RRR S1 S2, no murmur, no JVD  ABD: S, NT, ND  EXT: WWP, no edema  PSYCH: normal affect  SKIN: No rash    CXR: Personally reviewed    Labs: Personally reviewed                        15.5   7.91  )-----------( 261      ( 16 Jan 2024 12:49 )             44.7     01-16    131<L>  |  93<L>  |  14  ----------------------------<  177<H>  4.5   |  27  |  1.09    Ca    9.6      16 Jan 2024 12:49  Phos  2.2     01-16  Mg     1.80     01-16    TPro  7.2  /  Alb  4.2  /  TBili  0.9  /  DBili  x   /  AST  26  /  ALT  10  /  AlkPhos  61  01-16    PT/INR - ( 16 Jan 2024 16:16 )   PT: 10.9 sec;   INR: 0.97 ratio         PTT - ( 16 Jan 2024 16:16 )  PTT:30.4 sec    CARDIAC MARKERS ( 16 Jan 2024 14:44 )  158 ng/L / x     / x     / x     / x     / x      CARDIAC MARKERS ( 16 Jan 2024 12:49 )  99 ng/L / x     / x     / 190 U/L / x     / 24.8 ng/mL

## 2024-01-16 NOTE — CONSULT NOTE ADULT - ASSESSMENT
83M with PMH of ICM s/p MDT CRT-D, CAD s/p CABG 95' and stents, HTN, T2DM, prostate cancer presenting with chest pain and rising troponins concerning for NSTEMI. Will need ischemic evaluation with LHC. Noted patient had some sundowning and delirium after my evaluation.    Recs:  -c/w ASA, plavix, heparin gtt  -c/w metop succ 50mg  -start atorva 40mg  -check lipid panel, A1C  -trend troponins to peak  -obtain TTE  -NPO at MN, plan for tentative LHC tomorrow  -if chest pain recurs or worsens, please reach out

## 2024-01-16 NOTE — ED ADULT NURSE REASSESSMENT NOTE - NS ED NURSE REASSESS COMMENT FT1
Mobile Critical Care RN: patient is 83/M admitted for NSTEMI on heparin gtt, noted to have acute mental status change. patient now only speaking in Persian, unable to be redirected. aggressive toward staff and family member. patient administered IV meds as per orders, to be able to complete CT head. patient placed on zoll monitor with O2sat. CT scan performed successfully. now back in spot 7A, asleep, satting 94-95% on room air, pulse ox in place. pacemaker in placed, maintained on tele, MAP above 65. PIV x2. heparin gtt held at this time.

## 2024-01-16 NOTE — H&P ADULT - NSHPLABSRESULTS_GEN_ALL_CORE
Personally reviewed labs:                        13.4   8.37  )-----------( 202      ( 16 Jan 2024 21:56 )             38.0     01-16-24 @ 12:49    131<L>  |  93<L>  |  14             --------------------------< 177<H>     4.5  |  27  | 1.09    eGFR AA: --  eGFR N-AA: --    Calcium: 9.6  Phosphorus: 2.2<L>  Magnesium: 1.80    AST: 26    ALT: 10  AlkPhos: 61  Protein: 7.2  Albumin: 4.2  TBili: 0.9  D-Bili: --    Troponin 99--158      RADIOLOGY & ADDITIONAL TESTS:    EKG my independent interpretation: A-sensed, V-paced thyrhm, no STD or ERICKA, PAC    CXR my independent interpretation: clear lungs    Imaging personally reviewed:    CTH noncontrast:  IMPRESSION:    No acute intracranial abnormalities.    Small vessel and atrophic changes.      CT A/P noncontrast:  FINDINGS:  LOWER CHEST: Partially imaged leads of a defibrillator. No pericardial effusion. Normal-sized heart.    LIVER: Within normal limits.  BILE DUCTS: Normal caliber.  GALLBLADDER: Cholecystectomy.  SPLEEN: Within normal limits.  PANCREAS: Within normal limits.  ADRENALS: Within normal limits.  KIDNEYS/URETERS: Small hypodensity in the posterior right kidney, incompletely evaluated in this study, probably a cyst. Unremarkable left kidney. No hydronephrosis. No renal or ureteral stones.    BLADDER: Minimally distended.  REPRODUCTIVE ORGANS: Brachytherapy seeds in the prostate. Partially imaged left hydrocele.    BOWEL: Heavy stool burden. Redundant splenic flexure. Suture material at the rectum from prior anastomosis. Colonic diverticulosis. No bowel obstruction. Appendix is not definitively visualized. No evidence of inflammation in the pericecal region. Evaluation of the bowel is limited due to lack of oral contrast.  PERITONEUM: No ascites.  VESSELS: Atherosclerotic changes.  RETROPERITONEUM/LYMPH NODES: No lymphadenopathy.  ABDOMINAL WALL: Within normal limits.  BONES: Sternal wires are present. Degenerative changes of the spine.  SOFT TISSUES: Subcutaneous calcified granulomas in the bilateral buttocks.    IMPRESSION:  1. Heavy stool burden. Correlate for constipation. Otherwise, no acute findings.        Prior records reviewed: (Y/N)    External records reviewed: (Y/N)    Care discussed with consultants/other providers and details of discussion: (Y/N)    Additional history obtained from family: (Y/N)

## 2024-01-16 NOTE — ED ADULT NURSE NOTE - NSFALLUNIVINTERV_ED_ALL_ED
Bed/Stretcher in lowest position, wheels locked, appropriate side rails in place/Call bell, personal items and telephone in reach/Instruct patient to call for assistance before getting out of bed/chair/stretcher/Non-slip footwear applied when patient is off stretcher/[place] to call system/Physically safe environment - no spills, clutter or unnecessary equipment/Purposeful proactive rounding/Room/bathroom lighting operational, light cord in reach Bed/Stretcher in lowest position, wheels locked, appropriate side rails in place/Call bell, personal items and telephone in reach/Instruct patient to call for assistance before getting out of bed/chair/stretcher/Non-slip footwear applied when patient is off stretcher/[place] to call system/Physically safe environment - no spills, clutter or unnecessary equipment/Purposeful proactive rounding/Room/bathroom lighting operational, light cord in reach

## 2024-01-17 DIAGNOSIS — Z95.810 PRESENCE OF AUTOMATIC (IMPLANTABLE) CARDIAC DEFIBRILLATOR: ICD-10-CM

## 2024-01-17 LAB
A1C WITH ESTIMATED AVERAGE GLUCOSE RESULT: 6.5 % — HIGH (ref 4–5.6)
ANION GAP SERPL CALC-SCNC: 9 MMOL/L — SIGNIFICANT CHANGE UP (ref 7–14)
APTT BLD: 60.1 SEC — HIGH (ref 24.5–35.6)
APTT BLD: 70.9 SEC — HIGH (ref 24.5–35.6)
BUN SERPL-MCNC: 10 MG/DL — SIGNIFICANT CHANGE UP (ref 7–23)
CALCIUM SERPL-MCNC: 9.2 MG/DL — SIGNIFICANT CHANGE UP (ref 8.4–10.5)
CHLORIDE SERPL-SCNC: 98 MMOL/L — SIGNIFICANT CHANGE UP (ref 98–107)
CHOLEST SERPL-MCNC: 138 MG/DL — SIGNIFICANT CHANGE UP
CK MB BLD-MCNC: 18.5 % — HIGH (ref 0–2.5)
CK MB CFR SERPL CALC: 148.6 NG/ML — HIGH
CK SERPL-CCNC: 803 U/L — HIGH (ref 30–200)
CO2 SERPL-SCNC: 28 MMOL/L — SIGNIFICANT CHANGE UP (ref 22–31)
CREAT SERPL-MCNC: 1.12 MG/DL — SIGNIFICANT CHANGE UP (ref 0.5–1.3)
EGFR: 65 ML/MIN/1.73M2 — SIGNIFICANT CHANGE UP
ESTIMATED AVERAGE GLUCOSE: 140 — SIGNIFICANT CHANGE UP
GLUCOSE BLDC GLUCOMTR-MCNC: 109 MG/DL — HIGH (ref 70–99)
GLUCOSE BLDC GLUCOMTR-MCNC: 151 MG/DL — HIGH (ref 70–99)
GLUCOSE BLDC GLUCOMTR-MCNC: 87 MG/DL — SIGNIFICANT CHANGE UP (ref 70–99)
GLUCOSE SERPL-MCNC: 121 MG/DL — HIGH (ref 70–99)
HCT VFR BLD CALC: 36.4 % — LOW (ref 39–50)
HCT VFR BLD CALC: 43.1 % — SIGNIFICANT CHANGE UP (ref 39–50)
HDLC SERPL-MCNC: 41 MG/DL — SIGNIFICANT CHANGE UP
HGB BLD-MCNC: 12.5 G/DL — LOW (ref 13–17)
HGB BLD-MCNC: 14.9 G/DL — SIGNIFICANT CHANGE UP (ref 13–17)
LIPID PNL WITH DIRECT LDL SERPL: 75 MG/DL — SIGNIFICANT CHANGE UP
MAGNESIUM SERPL-MCNC: 2 MG/DL — SIGNIFICANT CHANGE UP (ref 1.6–2.6)
MCHC RBC-ENTMCNC: 31.8 PG — SIGNIFICANT CHANGE UP (ref 27–34)
MCHC RBC-ENTMCNC: 31.8 PG — SIGNIFICANT CHANGE UP (ref 27–34)
MCHC RBC-ENTMCNC: 34.3 GM/DL — SIGNIFICANT CHANGE UP (ref 32–36)
MCHC RBC-ENTMCNC: 34.6 GM/DL — SIGNIFICANT CHANGE UP (ref 32–36)
MCV RBC AUTO: 92.1 FL — SIGNIFICANT CHANGE UP (ref 80–100)
MCV RBC AUTO: 92.6 FL — SIGNIFICANT CHANGE UP (ref 80–100)
NON HDL CHOLESTEROL: 97 MG/DL — SIGNIFICANT CHANGE UP
NRBC # BLD: 0 /100 WBCS — SIGNIFICANT CHANGE UP (ref 0–0)
NRBC # BLD: 0 /100 WBCS — SIGNIFICANT CHANGE UP (ref 0–0)
NRBC # FLD: 0 K/UL — SIGNIFICANT CHANGE UP (ref 0–0)
NRBC # FLD: 0 K/UL — SIGNIFICANT CHANGE UP (ref 0–0)
PHOSPHATE SERPL-MCNC: 2.7 MG/DL — SIGNIFICANT CHANGE UP (ref 2.5–4.5)
PLATELET # BLD AUTO: 179 K/UL — SIGNIFICANT CHANGE UP (ref 150–400)
PLATELET # BLD AUTO: 213 K/UL — SIGNIFICANT CHANGE UP (ref 150–400)
POTASSIUM SERPL-MCNC: 4.1 MMOL/L — SIGNIFICANT CHANGE UP (ref 3.5–5.3)
POTASSIUM SERPL-SCNC: 4.1 MMOL/L — SIGNIFICANT CHANGE UP (ref 3.5–5.3)
RBC # BLD: 3.93 M/UL — LOW (ref 4.2–5.8)
RBC # BLD: 4.68 M/UL — SIGNIFICANT CHANGE UP (ref 4.2–5.8)
RBC # FLD: 12.6 % — SIGNIFICANT CHANGE UP (ref 10.3–14.5)
RBC # FLD: 13.2 % — SIGNIFICANT CHANGE UP (ref 10.3–14.5)
SODIUM SERPL-SCNC: 135 MMOL/L — SIGNIFICANT CHANGE UP (ref 135–145)
TRIGL SERPL-MCNC: 110 MG/DL — SIGNIFICANT CHANGE UP
TROPONIN T, HIGH SENSITIVITY RESULT: 1209 NG/L — CRITICAL HIGH
TROPONIN T, HIGH SENSITIVITY RESULT: 1237 NG/L — CRITICAL HIGH
WBC # BLD: 7.17 K/UL — SIGNIFICANT CHANGE UP (ref 3.8–10.5)
WBC # BLD: 8.52 K/UL — SIGNIFICANT CHANGE UP (ref 3.8–10.5)
WBC # FLD AUTO: 7.17 K/UL — SIGNIFICANT CHANGE UP (ref 3.8–10.5)
WBC # FLD AUTO: 8.52 K/UL — SIGNIFICANT CHANGE UP (ref 3.8–10.5)

## 2024-01-17 PROCEDURE — 99232 SBSQ HOSP IP/OBS MODERATE 35: CPT

## 2024-01-17 PROCEDURE — 99231 SBSQ HOSP IP/OBS SF/LOW 25: CPT

## 2024-01-17 PROCEDURE — 99233 SBSQ HOSP IP/OBS HIGH 50: CPT

## 2024-01-17 RX ORDER — INSULIN LISPRO 100/ML
VIAL (ML) SUBCUTANEOUS AT BEDTIME
Refills: 0 | Status: DISCONTINUED | OUTPATIENT
Start: 2024-01-17 | End: 2024-01-22

## 2024-01-17 RX ORDER — ASPIRIN/CALCIUM CARB/MAGNESIUM 324 MG
1 TABLET ORAL
Refills: 0 | DISCHARGE

## 2024-01-17 RX ORDER — INFLUENZA VIRUS VACCINE 15; 15; 15; 15 UG/.5ML; UG/.5ML; UG/.5ML; UG/.5ML
0.7 SUSPENSION INTRAMUSCULAR ONCE
Refills: 0 | Status: DISCONTINUED | OUTPATIENT
Start: 2024-01-17 | End: 2024-01-22

## 2024-01-17 RX ORDER — SODIUM CHLORIDE 9 MG/ML
1000 INJECTION, SOLUTION INTRAVENOUS
Refills: 0 | Status: DISCONTINUED | OUTPATIENT
Start: 2024-01-17 | End: 2024-01-20

## 2024-01-17 RX ORDER — TIMOLOL 0.5 %
1 DROPS OPHTHALMIC (EYE)
Refills: 0 | DISCHARGE

## 2024-01-17 RX ORDER — METOPROLOL TARTRATE 50 MG
1 TABLET ORAL
Refills: 0 | DISCHARGE

## 2024-01-17 RX ORDER — OXYMETAZOLINE HYDROCHLORIDE 0.5 MG/ML
2 SPRAY NASAL
Refills: 0 | Status: DISCONTINUED | OUTPATIENT
Start: 2024-01-17 | End: 2024-01-22

## 2024-01-17 RX ORDER — SODIUM CHLORIDE 9 MG/ML
1000 INJECTION INTRAMUSCULAR; INTRAVENOUS; SUBCUTANEOUS
Refills: 0 | Status: DISCONTINUED | OUTPATIENT
Start: 2024-01-17 | End: 2024-01-17

## 2024-01-17 RX ORDER — OLANZAPINE 15 MG/1
1.25 TABLET, FILM COATED ORAL EVERY 6 HOURS
Refills: 0 | Status: DISCONTINUED | OUTPATIENT
Start: 2024-01-17 | End: 2024-01-22

## 2024-01-17 RX ORDER — INSULIN LISPRO 100/ML
VIAL (ML) SUBCUTANEOUS
Refills: 0 | Status: DISCONTINUED | OUTPATIENT
Start: 2024-01-17 | End: 2024-01-22

## 2024-01-17 RX ADMIN — LATANOPROST 1 DROP(S): 0.05 SOLUTION/ DROPS OPHTHALMIC; TOPICAL at 02:59

## 2024-01-17 RX ADMIN — HEPARIN SODIUM 750 UNIT(S)/HR: 5000 INJECTION INTRAVENOUS; SUBCUTANEOUS at 09:04

## 2024-01-17 RX ADMIN — Medication 81 MILLIGRAM(S): at 13:34

## 2024-01-17 RX ADMIN — LATANOPROST 1 DROP(S): 0.05 SOLUTION/ DROPS OPHTHALMIC; TOPICAL at 22:03

## 2024-01-17 RX ADMIN — OXYMETAZOLINE HYDROCHLORIDE 2 SPRAY(S): 0.5 SPRAY NASAL at 22:03

## 2024-01-17 RX ADMIN — Medication 1 DROP(S): at 17:18

## 2024-01-17 RX ADMIN — HEPARIN SODIUM 750 UNIT(S)/HR: 5000 INJECTION INTRAVENOUS; SUBCUTANEOUS at 14:36

## 2024-01-17 RX ADMIN — Medication 1 DROP(S): at 07:10

## 2024-01-17 RX ADMIN — ATORVASTATIN CALCIUM 40 MILLIGRAM(S): 80 TABLET, FILM COATED ORAL at 22:03

## 2024-01-17 RX ADMIN — Medication 1 MILLIGRAM(S): at 00:27

## 2024-01-17 RX ADMIN — HEPARIN SODIUM 750 UNIT(S)/HR: 5000 INJECTION INTRAVENOUS; SUBCUTANEOUS at 08:30

## 2024-01-17 RX ADMIN — SODIUM CHLORIDE 75 MILLILITER(S): 9 INJECTION INTRAMUSCULAR; INTRAVENOUS; SUBCUTANEOUS at 09:19

## 2024-01-17 RX ADMIN — HEPARIN SODIUM 750 UNIT(S)/HR: 5000 INJECTION INTRAVENOUS; SUBCUTANEOUS at 20:28

## 2024-01-17 RX ADMIN — HEPARIN SODIUM 750 UNIT(S)/HR: 5000 INJECTION INTRAVENOUS; SUBCUTANEOUS at 01:16

## 2024-01-17 RX ADMIN — SODIUM CHLORIDE 45 MILLILITER(S): 9 INJECTION, SOLUTION INTRAVENOUS at 19:10

## 2024-01-17 RX ADMIN — CLOPIDOGREL BISULFATE 75 MILLIGRAM(S): 75 TABLET, FILM COATED ORAL at 13:34

## 2024-01-17 NOTE — PATIENT PROFILE ADULT - FALL HARM RISK - HARM RISK INTERVENTIONS

## 2024-01-17 NOTE — PROGRESS NOTE ADULT - PROBLEM SELECTOR PROBLEM 4
Type 2 diabetes mellitus with hyperglycemia, without long-term current use of insulin ICD (implantable cardioverter-defibrillator) in place

## 2024-01-17 NOTE — PROGRESS NOTE ADULT - SUBJECTIVE AND OBJECTIVE BOX
Subjective: Denies HA, lightheadedness, dizziness, CP, palpitation, SOB, abdominal pain, and N/V.      Interval Events:  - Presented with sudden onset of pressure like epigastric chest pain that radiating up to his chest and left shoulder. Labs were notable for troponins 99--158 and given plavix load, ASA load and started on heparin gtt  - EP was consulted and device interrogation revealed the device was at ROLY and had no events. Of note patient recently saw EP Dr. Cruz with interrogation revealing ROLY with plan for downgrade to BiV PPM and generator change. Interrogation at that time revealed high LV threshold 2.125V.   - Cardiology was consulted for ACS. Plan for C today  - Hospital course c/b AMS  amd was combastive s/p haldol and stat CT which was unremarkable.  MEDICATIONS  (STANDING):  aspirin enteric coated 81 milliGRAM(s) Oral daily  atorvastatin 40 milliGRAM(s) Oral at bedtime  clopidogrel Tablet 75 milliGRAM(s) Oral daily  dextrose 5%. 1000 milliLiter(s) (100 mL/Hr) IV Continuous <Continuous>  dextrose 5%. 1000 milliLiter(s) (50 mL/Hr) IV Continuous <Continuous>  dextrose 50% Injectable 25 Gram(s) IV Push once  dextrose 50% Injectable 25 Gram(s) IV Push once  dextrose 50% Injectable 12.5 Gram(s) IV Push once  glucagon  Injectable 1 milliGRAM(s) IntraMuscular once  heparin  Infusion.  Unit(s)/Hr (7.5 mL/Hr) IV Continuous <Continuous>  insulin lispro (ADMELOG) corrective regimen sliding scale   SubCutaneous every 6 hours  latanoprost 0.005% Ophthalmic Solution 1 Drop(s) Both EYES at bedtime  metoprolol succinate ER 50 milliGRAM(s) Oral daily  sodium chloride 0.9%. 1000 milliLiter(s) (75 mL/Hr) IV Continuous <Continuous>  timolol 0.5% Solution 1 Drop(s) Both EYES two times a day    MEDICATIONS  (PRN):  dextrose Oral Gel 15 Gram(s) Oral once PRN Blood Glucose LESS THAN 70 milliGRAM(s)/deciliter  heparin   Injectable 3800 Unit(s) IV Push every 6 hours PRN For aPTT less than 40  LORazepam   Injectable 1 milliGRAM(s) IV Push every 6 hours PRN Agitation      Vital Signs Last 24 Hrs  T(C): 36.9 (16 Jan 2024 21:25), Max: 36.9 (16 Jan 2024 21:25)  T(F): 98.4 (16 Jan 2024 21:25), Max: 98.4 (16 Jan 2024 21:25)  HR: 76 (17 Jan 2024 07:00) (63 - 98)  BP: 104/64 (17 Jan 2024 07:00) (94/54 - 158/83)  BP(mean): 77 (17 Jan 2024 07:00) (77 - 82)  RR: 16 (17 Jan 2024 07:00) (16 - 23)  SpO2: 100% (17 Jan 2024 07:00) (94% - 100%)    Parameters below as of 17 Jan 2024 07:00  Patient On (Oxygen Delivery Method): nasal cannula  O2 Flow (L/min): 2    I&O's Detail      Physical Exam:  GENERAL: Lying in bed, well appearing, speaking in full sentence, in NAD  HEART: S1S2 RRR  PULMONARY: CTABL, normal respiratory effort  ABDOMEN: + Bowel sounds present, soft, NDNT  EXTREMITIES:  Warm, well -perfused, no pedal edema, distal pulses present  NEUROLOGICAL:      TELEMETERIC:                            14.9   8.52  )-----------( 213      ( 17 Jan 2024 06:58 )             43.1     PT/INR - ( 16 Jan 2024 16:16 )   PT: 10.9 sec;   INR: 0.97 ratio         PTT - ( 17 Jan 2024 07:16 )  PTT:70.9 sec  01-16    132<L>  |  95<L>  |  12  ----------------------------<  129<H>  3.5   |  25  |  1.08    Ca    8.7      16 Jan 2024 21:56  Phos  2.2     01-16  Mg     1.80     01-16    TPro  6.1  /  Alb  3.5  /  TBili  0.8  /  DBili  x   /  AST  72<H>  /  ALT  11  /  AlkPhos  51  01-16    CARDIAC MARKERS ( 16 Jan 2024 21:56 )  x     / x     / 584 U/L / x     / 105.7 ng/mL  CARDIAC MARKERS ( 16 Jan 2024 12:49 )  x     / x     / 190 U/L / x     / 24.8 ng/mL                   Subjective: Denies HA, lightheadedness, dizziness, CP, palpitation, SOB, abdominal pain, and N/V.      Interval Events:  - Presented with sudden onset of pressure like epigastric chest pain that radiating up to his chest and left shoulder. Labs were notable for troponins 99--158 and given plavix load, ASA load and started on heparin gtt  - EP was consulted and device interrogation revealed the device was at ROLY and had no events. Of note patient recently saw EP Dr. Cruz with interrogation revealing ROLY with plan for downgrade to BiV PPM and generator change. Interrogation at that time revealed high LV threshold 2.125V.   - Cardiology was consulted for ACS. Plan for C today  - Hospital course c/b AMS  amd was combative s/p haldol and stat CT which was unremarkable.    MEDICATIONS  (STANDING):  aspirin enteric coated 81 milliGRAM(s) Oral daily  atorvastatin 40 milliGRAM(s) Oral at bedtime  clopidogrel Tablet 75 milliGRAM(s) Oral daily  dextrose 5%. 1000 milliLiter(s) (100 mL/Hr) IV Continuous <Continuous>  dextrose 5%. 1000 milliLiter(s) (50 mL/Hr) IV Continuous <Continuous>  dextrose 50% Injectable 25 Gram(s) IV Push once  dextrose 50% Injectable 25 Gram(s) IV Push once  dextrose 50% Injectable 12.5 Gram(s) IV Push once  glucagon  Injectable 1 milliGRAM(s) IntraMuscular once  heparin  Infusion.  Unit(s)/Hr (7.5 mL/Hr) IV Continuous <Continuous>  insulin lispro (ADMELOG) corrective regimen sliding scale   SubCutaneous every 6 hours  latanoprost 0.005% Ophthalmic Solution 1 Drop(s) Both EYES at bedtime  metoprolol succinate ER 50 milliGRAM(s) Oral daily  sodium chloride 0.9%. 1000 milliLiter(s) (75 mL/Hr) IV Continuous <Continuous>  timolol 0.5% Solution 1 Drop(s) Both EYES two times a day    MEDICATIONS  (PRN):  dextrose Oral Gel 15 Gram(s) Oral once PRN Blood Glucose LESS THAN 70 milliGRAM(s)/deciliter  heparin   Injectable 3800 Unit(s) IV Push every 6 hours PRN For aPTT less than 40  LORazepam   Injectable 1 milliGRAM(s) IV Push every 6 hours PRN Agitation      Vital Signs Last 24 Hrs  T(C): 36.9 (16 Jan 2024 21:25), Max: 36.9 (16 Jan 2024 21:25)  T(F): 98.4 (16 Jan 2024 21:25), Max: 98.4 (16 Jan 2024 21:25)  HR: 76 (17 Jan 2024 07:00) (63 - 98)  BP: 104/64 (17 Jan 2024 07:00) (94/54 - 158/83)  BP(mean): 77 (17 Jan 2024 07:00) (77 - 82)  RR: 16 (17 Jan 2024 07:00) (16 - 23)  SpO2: 100% (17 Jan 2024 07:00) (94% - 100%)    Parameters below as of 17 Jan 2024 07:00  Patient On (Oxygen Delivery Method): nasal cannula  O2 Flow (L/min): 2    I&O's Detail      Physical Exam:  GENERAL: Lying in bed,  in NAD, stupor, took of several attempt of sternal rub to briefly arousal patient.   HEART: S1S2 RRR  PULMONARY: CTABL anteriorly, normal respiratory effort  ABDOMEN: + Bowel sounds present, soft, NDNT  EXTREMITIES:  Warm, well -perfused, no pedal edema, distal pulses present    TELEMETERIC: AV paced HR 70s, with some PVC and rare bigeminy                             14.9   8.52  )-----------( 213      ( 17 Jan 2024 06:58 )             43.1     PT/INR - ( 16 Jan 2024 16:16 )   PT: 10.9 sec;   INR: 0.97 ratio         PTT - ( 17 Jan 2024 07:16 )  PTT:70.9 sec  01-16    132<L>  |  95<L>  |  12  ----------------------------<  129<H>  3.5   |  25  |  1.08    Ca    8.7      16 Jan 2024 21:56  Phos  2.2     01-16  Mg     1.80     01-16    TPro  6.1  /  Alb  3.5  /  TBili  0.8  /  DBili  x   /  AST  72<H>  /  ALT  11  /  AlkPhos  51  01-16    CARDIAC MARKERS ( 16 Jan 2024 21:56 )  x     / x     / 584 U/L / x     / 105.7 ng/mL  CARDIAC MARKERS ( 16 Jan 2024 12:49 )  x     / x     / 190 U/L / x     / 24.8 ng/mL                   Subjective: Denies HA, lightheadedness, dizziness, CP, palpitation, SOB, abdominal pain, and N/V.      Interval Events:  - Presented with sudden onset of pressure like epigastric chest pain that radiating up to his chest and left shoulder. Labs were notable for troponins 99--158 and given plavix load, ASA load and started on heparin gtt  - EP was consulted and device interrogation revealed the device was at ROLY and had no events. Of note patient recently saw EP Dr. Cruz with interrogation revealing ROLY with plan for downgrade to BiV PPM and generator change. Interrogation at that time revealed high LV threshold 2.125V.   - Cardiology was consulted for ACS. Plan for LHC today  - Hospital course c/b AMS and patient was combative s/p haldol and ativan and stat CT which was unremarkable.    MEDICATIONS  (STANDING):  aspirin enteric coated 81 milliGRAM(s) Oral daily  atorvastatin 40 milliGRAM(s) Oral at bedtime  clopidogrel Tablet 75 milliGRAM(s) Oral daily  dextrose 5%. 1000 milliLiter(s) (100 mL/Hr) IV Continuous <Continuous>  dextrose 5%. 1000 milliLiter(s) (50 mL/Hr) IV Continuous <Continuous>  dextrose 50% Injectable 25 Gram(s) IV Push once  dextrose 50% Injectable 25 Gram(s) IV Push once  dextrose 50% Injectable 12.5 Gram(s) IV Push once  glucagon  Injectable 1 milliGRAM(s) IntraMuscular once  heparin  Infusion.  Unit(s)/Hr (7.5 mL/Hr) IV Continuous <Continuous>  insulin lispro (ADMELOG) corrective regimen sliding scale   SubCutaneous every 6 hours  latanoprost 0.005% Ophthalmic Solution 1 Drop(s) Both EYES at bedtime  metoprolol succinate ER 50 milliGRAM(s) Oral daily  sodium chloride 0.9%. 1000 milliLiter(s) (75 mL/Hr) IV Continuous <Continuous>  timolol 0.5% Solution 1 Drop(s) Both EYES two times a day    MEDICATIONS  (PRN):  dextrose Oral Gel 15 Gram(s) Oral once PRN Blood Glucose LESS THAN 70 milliGRAM(s)/deciliter  heparin   Injectable 3800 Unit(s) IV Push every 6 hours PRN For aPTT less than 40  LORazepam   Injectable 1 milliGRAM(s) IV Push every 6 hours PRN Agitation      Vital Signs Last 24 Hrs  T(C): 36.9 (16 Jan 2024 21:25), Max: 36.9 (16 Jan 2024 21:25)  T(F): 98.4 (16 Jan 2024 21:25), Max: 98.4 (16 Jan 2024 21:25)  HR: 76 (17 Jan 2024 07:00) (63 - 98)  BP: 104/64 (17 Jan 2024 07:00) (94/54 - 158/83)  BP(mean): 77 (17 Jan 2024 07:00) (77 - 82)  RR: 16 (17 Jan 2024 07:00) (16 - 23)  SpO2: 100% (17 Jan 2024 07:00) (94% - 100%)    Parameters below as of 17 Jan 2024 07:00  Patient On (Oxygen Delivery Method): nasal cannula  O2 Flow (L/min): 2    I&O's Detail      Physical Exam:  GENERAL: Lying in bed,  in NAD, stupor, took of several attempt of sternal rub to briefly arousal patient.   HEART: S1S2 RRR  PULMONARY: CTABL anteriorly, normal respiratory effort  ABDOMEN: + Bowel sounds present, soft, NDNT  EXTREMITIES:  Warm, well -perfused, no pedal edema, distal pulses present    TELEMETERIC: AV paced HR 70s, with some PVC and rare bigeminy                             14.9   8.52  )-----------( 213      ( 17 Jan 2024 06:58 )             43.1     PT/INR - ( 16 Jan 2024 16:16 )   PT: 10.9 sec;   INR: 0.97 ratio         PTT - ( 17 Jan 2024 07:16 )  PTT:70.9 sec  01-16    132<L>  |  95<L>  |  12  ----------------------------<  129<H>  3.5   |  25  |  1.08    Ca    8.7      16 Jan 2024 21:56  Phos  2.2     01-16  Mg     1.80     01-16    TPro  6.1  /  Alb  3.5  /  TBili  0.8  /  DBili  x   /  AST  72<H>  /  ALT  11  /  AlkPhos  51  01-16    CARDIAC MARKERS ( 16 Jan 2024 21:56 )  x     / x     / 584 U/L / x     / 105.7 ng/mL  CARDIAC MARKERS ( 16 Jan 2024 12:49 )  x     / x     / 190 U/L / x     / 24.8 ng/mL

## 2024-01-17 NOTE — ED ADULT NURSE REASSESSMENT NOTE - NS ED NURSE REASSESS COMMENT FT1
Patient is scheduled for a cath procedure today, called cath and states he is still scheduled but not sure of a time at the moment. Family made aware.

## 2024-01-17 NOTE — PROGRESS NOTE ADULT - ASSESSMENT
83M early dementia, HTN, DM2, cardiomyopathy, s/p PPM in 2009, glaucoma, CVA 1993? (ambulates with walker), CABG x4 1995, cardiac stent 1998, prostate cancer tx with radioactive seeds 2004, colon cancer tx with colon resection 2015 presenting with epigastric pain and chest pain since 3AM night prior to admission found to have NSTEMI, course c/b acute encephalopathy.

## 2024-01-17 NOTE — PATIENT PROFILE ADULT - DEAF OR HARD OF HEARING?
Recovery period without difficulty. Pt alert and oriented and denies pain. Dressing is clean, dry, and intact. Reviewed discharge instructions with patient and wife, both verbalized understanding. [facility]. Vital signs and Ila score completed. yes

## 2024-01-17 NOTE — ED ADULT NURSE REASSESSMENT NOTE - NS ED NURSE REASSESS COMMENT FT1
PT is restless in the stretcher at this time with PCA Alex at bedside, easily arousable to verbal stimuli. no apparent distress noted at this time. pt medicated per MD order. hand off will be given to primary nurse when return to area.

## 2024-01-17 NOTE — PROGRESS NOTE ADULT - PROBLEM SELECTOR PLAN 4
Hold metformin  - HbA1c is 6.5%  - DM diet, FAUSTO - EP following, ICD downgrade to BiV PPM when medical stable

## 2024-01-17 NOTE — PROGRESS NOTE ADULT - SUBJECTIVE AND OBJECTIVE BOX
Patient seen and examined at bedside.    Overnight Events: NAEON    REVIEW OF SYSTEMS:  CONSTITUTIONAL: No weakness, fevers or chills  EYES/ENT: No visual changes;  No dysphagia  NECK: No pain or stiffness  RESPIRATORY: No cough, wheezing, hemoptysis; No shortness of breath  CARDIOVASCULAR: No chest pain or palpitations; No lower extremity edema  GASTROINTESTINAL: No abdominal or epigastric pain. No nausea, vomiting, or hematemesis; No diarrhea or constipation. No melena or hematochezia.  BACK: No back pain  GENITOURINARY: No dysuria, frequency or hematuria  NEUROLOGICAL: No numbness or weakness  SKIN: No itching, burning, rashes, or lesions   All other review of systems is negative unless indicated above.            Current Meds:  aspirin enteric coated 81 milliGRAM(s) Oral daily  atorvastatin 40 milliGRAM(s) Oral at bedtime  clopidogrel Tablet 75 milliGRAM(s) Oral daily  dextrose 5%. 1000 milliLiter(s) IV Continuous <Continuous>  dextrose 5%. 1000 milliLiter(s) IV Continuous <Continuous>  dextrose 50% Injectable 25 Gram(s) IV Push once  dextrose 50% Injectable 25 Gram(s) IV Push once  dextrose 50% Injectable 12.5 Gram(s) IV Push once  dextrose Oral Gel 15 Gram(s) Oral once PRN  glucagon  Injectable 1 milliGRAM(s) IntraMuscular once  heparin   Injectable 3800 Unit(s) IV Push every 6 hours PRN  heparin  Infusion.  Unit(s)/Hr IV Continuous <Continuous>  insulin lispro (ADMELOG) corrective regimen sliding scale   SubCutaneous every 6 hours  latanoprost 0.005% Ophthalmic Solution 1 Drop(s) Both EYES at bedtime  LORazepam   Injectable 1 milliGRAM(s) IV Push every 6 hours PRN  metoprolol succinate ER 50 milliGRAM(s) Oral daily  sodium chloride 0.9%. 1000 milliLiter(s) IV Continuous <Continuous>  timolol 0.5% Solution 1 Drop(s) Both EYES two times a day      Vitals:  T(F): 98.4 (), Max: 98.4 ()  HR: 76 () (63 - 98)  BP: 104/64 () (94/54 - 158/83)  RR: 16 ()  SpO2: 100% ()  I&O's Summary      Physical Exam:  GEN: NAD, drowsy  HEENT: EOMI, clear sclera  PULM: CTA b/l, no wheeze  CV: RRR S1 S2, no murmur, no JVD  ABD: S, NT, ND  EXT: WWP, no edema  PSYCH: normal affect  SKIN: No rash                          14.9   8.52  )-----------( 213      ( 2024 06:58 )             43.1         135  |  98  |  10  ----------------------------<  121<H>  4.1   |  28  |  1.12    Ca    9.2      2024 06:58  Phos  2.7       Mg     2.00         TPro  6.1  /  Alb  3.5  /  TBili  0.8  /  DBili  x   /  AST  72<H>  /  ALT  11  /  AlkPhos  51  01-16    PT/INR - ( 2024 16:16 )   PT: 10.9 sec;   INR: 0.97 ratio         PTT - ( 2024 07:16 )  PTT:70.9 sec  CARDIAC MARKERS ( 2024 06:58 )  x     / x     / 803 U/L / x     / 148.6 ng/mL  CARDIAC MARKERS ( 2024 21:56 )  x     / x     / 584 U/L / x     / 105.7 ng/mL  CARDIAC MARKERS ( 2024 12:49 )  x     / x     / 190 U/L / x     / 24.8 ng/mL        Total Cholesterol: 138  LDL: --  HDL: 41  T

## 2024-01-17 NOTE — PROGRESS NOTE ADULT - SUBJECTIVE AND OBJECTIVE BOX
Patient is a 83y old  Male who presents with a chief complaint of left sided chest pain and epigastric pain x1 day      SUBJECTIVE / OVERNIGHT EVENTS:    No CP, SOB, f/c/n/v  ROS limited by confusion but denies above      MEDICATIONS  (STANDING):  aspirin enteric coated 81 milliGRAM(s) Oral daily  atorvastatin 40 milliGRAM(s) Oral at bedtime  clopidogrel Tablet 75 milliGRAM(s) Oral daily  glucagon  Injectable 1 milliGRAM(s) IntraMuscular once  heparin  Infusion.  Unit(s)/Hr (7.5 mL/Hr) IV Continuous <Continuous>  influenza  Vaccine (HIGH DOSE) 0.7 milliLiter(s) IntraMuscular once  insulin lispro (ADMELOG) corrective regimen sliding scale   SubCutaneous every 6 hours  latanoprost 0.005% Ophthalmic Solution 1 Drop(s) Both EYES at bedtime  metoprolol succinate ER 50 milliGRAM(s) Oral daily  sodium chloride 0.9%. 1000 milliLiter(s) (75 mL/Hr) IV Continuous <Continuous>  timolol 0.5% Solution 1 Drop(s) Both EYES two times a day    MEDICATIONS  (PRN):  dextrose Oral Gel 15 Gram(s) Oral once PRN Blood Glucose LESS THAN 70 milliGRAM(s)/deciliter  heparin   Injectable 3800 Unit(s) IV Push every 6 hours PRN For aPTT less than 40  LORazepam   Injectable 1 milliGRAM(s) IV Push every 6 hours PRN Agitation    T(C): 36.7 (01-17-24 @ 10:36), Max: 36.9 (01-16-24 @ 21:25)  HR: 74 (01-17-24 @ 10:36) (63 - 98)  BP: 127/75 (01-17-24 @ 10:36) (94/54 - 158/83)  RR: 16 (01-17-24 @ 10:36) (16 - 23)  SpO2: 100% (01-17-24 @ 10:36) (94% - 100%)    CAPILLARY BLOOD GLUCOSE  POCT Blood Glucose.: 109 mg/dL (17 Jan 2024 11:32)  POCT Blood Glucose.: 111 mg/dL (17 Jan 2024 06:48)  POCT Blood Glucose.: 117 mg/dL (17 Jan 2024 00:20)  POCT Blood Glucose.: 132 mg/dL (16 Jan 2024 19:11)      PHYSICAL EXAM:  GENERAL: NAD   CHEST/LUNG: Clear to auscultation bilaterally; No wheeze  HEART: Regular rate and rhythm; No murmurs, rubs, or gallops  ABDOMEN: Soft, Nontender, Nondistended; Bowel sounds present  EXTREMITIES:   warm and well perfused, No clubbing, cyanosis, or edema  PSYCH: AAOx1, self, confused, following commands (lifts legs, open eyes etc)  NEUROLOGY: non-focal  SKIN: No rashes or lesions    LABS:                        14.9   8.52  )-----------( 213      ( 17 Jan 2024 06:58 )             43.1     01-17    135  |  98  |  10  ----------------------------<  121<H>  4.1   |  28  |  1.12    Ca    9.2      17 Jan 2024 06:58  Phos  2.7     01-17  Mg     2.00     01-17    TPro  6.1  /  Alb  3.5  /  TBili  0.8  /  DBili  x   /  AST  72<H>  /  ALT  11  /  AlkPhos  51  01-16    PT/INR - ( 16 Jan 2024 16:16 )   PT: 10.9 sec;   INR: 0.97 ratio         PTT - ( 17 Jan 2024 07:16 )  PTT:70.9 sec  CARDIAC MARKERS ( 17 Jan 2024 06:58 )  x     / x     / 803 U/L / x     / 148.6 ng/mL  CARDIAC MARKERS ( 16 Jan 2024 21:56 )  x     / x     / 584 U/L / x     / 105.7 ng/mL  CARDIAC MARKERS ( 16 Jan 2024 12:49 )  x     / x     / 190 U/L / x     / 24.8 ng/mL    Urinalysis Basic - ( 17 Jan 2024 06:58 )  Color: x / Appearance: x / SG: x / pH: x  Gluc: 121 mg/dL / Ketone: x  / Bili: x / Urobili: x   Blood: x / Protein: x / Nitrite: x   Leuk Esterase: x / RBC: x / WBC x   Sq Epi: x / Non Sq Epi: x / Bacteria: x    Care Discussed with Consultants/Other Providers:

## 2024-01-17 NOTE — PROGRESS NOTE ADULT - ASSESSMENT
83M with PMH of ICM s/p MDT CRT-D, CAD s/p CABG 95' and stents, HTN, T2DM, prostate cancer presenting with chest pain and rising troponins concerning for NSTEMI. Will need ischemic evaluation with [facility]. Course c/b by sundowning. Troponins peaked at 1K.     Recs:  -c/w ASA, plavix, heparin gtt  -c/w metop succ 50mg, atorva 40mg  -obtain TTE  -NPO for [facility] today  -if chest pain recurs or worsens, please reach out   83M with PMH of ICM s/p MDT CRT-D, CAD s/p CABG 95' and stents, HTN, T2DM, prostate cancer presenting with chest pain and rising troponins concerning for NSTEMI. Will need ischemic evaluation with [facility]. Course c/b by sundowning. Troponins peaked at 1K.     Recs:  -c/w ASA, plavix, heparin gtt  -c/w metop succ 50mg, atorva 40mg  -obtain TTE  -LHC delayed due to delirium, will wait for TTE first  -if chest pain recurs or worsens, please reach out

## 2024-01-17 NOTE — PHARMACOTHERAPY INTERVENTION NOTE - COMMENTS
Medication history is incomplete. Unable to verify patient's medication list with two sources. Obtained medication list from family member. Patient has medications filled in [facility] therefore we cannot verify medication fill history. Please call spectra [phone] if you have any questions.   Of note:  -Latanoprost and timolol were not on patient list, however family member believes he is still using both.  -Family member requesting Afrin for patient as he is having trouble breathing through his nose and uses this at home.  Made family member aware afrin might not be a good option given patient's cardiac issues.

## 2024-01-17 NOTE — PROGRESS NOTE ADULT - PROBLEM SELECTOR PLAN 5
SBP dropped to 90s s/p sedation. Likely sedation effect and also noted to have multiple vomiting episodes prior to arrival per niece. Dry lips on exam.. Patient repositioned in bed and repeat 108/63. 500cc bolus as well.  - hold losartan, HCTZ  - c/w metoprolol with hold parameters  - on 75 cc/hr Hold metformin  - HbA1c is 6.5%  - DM diet, FAUSTO

## 2024-01-17 NOTE — ED ADULT NURSE REASSESSMENT NOTE - NS ED NURSE REASSESS COMMENT FT1
Received pt in stretcher admitted to telemetry for Chest pain/ NSTEMI. Patient started on heparin 2/2 nstemi. Repeat PTT resulted as therapeutic with no change, based on acs protocal and continued at 7.5. On assessment, pt is asleep but arousable, remains on 1:1 for agitation. Plan for possible angiogram in the AM and therefore NPO. Will follow up with admission team. Received pt in stretcher admitted to telemetry for Chest pain/ NSTEMI. Patient started on heparin 2/2 nstemi. Repeat PTT resulted as therapeutic with no change, based on acs protocol and continued at 7.5U/hr. Confirmed with pharmacy and Nursing Education that next PTT is scheduled on 1/18 in the AM. As per pharmacy since pt has been on heparin and range is thereuptic, pt no longer needs q 6 hour PTT. On assessment, pt is asleep but arousable, remains on 1:1 for agitation. Plan for possible angiogram in the AM and therefore NPO. Will follow up with admission team.

## 2024-01-17 NOTE — PROGRESS NOTE ADULT - ASSESSMENT
This is a 83 year old man with PMHx early dementia? hx cardiomyopathy, s/p MDT ICD, glaucoma, CVA 1993? (ambulates with walker), HTN, DM2, CABG x4 1995, cardiac stent 1998, prostate cancer tx with radioactive seeds 2004, colon cancer tx with colon resection 2015 presenting with epigastric chest pain and admitted for NSTEMI. Device interrogation revealed the device was at ROLY and had no events. Of note patient recently saw EP Dr. Cruz with interrogation revealing ROLY with plan for downgrade to BiV PPM and generator change. Cardiology conuslted for ACS and is planned for [facility]. Course c/b by AMS.    Plan:  1. Chest Pain - interrogation no AICD shocks or arrhthymias suspect ACS   2. ICM s/p MDT BiV AICD  3. CAD s/p CABG and stents    - Continuous telemetric monitoring  - Monitor electrolytes and replete K to 4 and Mg to 2  -GDMT and Ischemic workup per cardiology- tentatively plan for LHC today   - Plan for ICD generator change when medical stable  - Continue care per primary team     Wendy Ramirez PA-C  Patient to be staff with attending. Please await attending addendum      This is a 83 year old man with PMHx early dementia? hx cardiomyopathy, s/p MDT ICD, glaucoma, CVA 1993? (ambulates with walker), HTN, DM2, CABG x4 1995, cardiac stent 1998, prostate cancer tx with radioactive seeds 2004, colon cancer tx with colon resection 2015 presenting with epigastric chest pain and admitted for NSTEMI. Device interrogation revealed the device was at ROLY and had no events. Of note patient recently saw EP Dr. Cruz with interrogation revealing ROLY with plan for downgrade to BiV PPM and generator change. Cardiology conuslted for ACS and is planned for [facility]. Course c/b by AMS.    Plan:  1. Chest Pain - interrogation no AICD shocks or arrhthymias suspect ACS   2. ICM s/p MDT BiV AICD  3. CAD s/p CABG and stents    - Continuous telemetric monitoring  - Monitor electrolytes and replete K to 4 and Mg to 2  - GDMT and Ischemic workup per cardiology- tentatively plan for LHC today   - Plan for ICD generator change when medical stable  - Continue care per primary team     Wendy Ramirez PA-C  Patient to be staff with attending. Please await attending addendum      This is a 83 year old man with PMHx early dementia? hx cardiomyopathy, s/p MDT ICD, glaucoma, CVA 1993? (ambulates with walker), HTN, DM2, CABG x4 1995, cardiac stent 1998, prostate cancer tx with radioactive seeds 2004, colon cancer tx with colon resection 2015 presenting with epigastric chest pain and admitted for NSTEMI. Device interrogation revealed the device was at ROLY and had no events. Of note patient recently saw EP Dr. Cruz with interrogation revealing ROLY with plan for downgrade to BiV PPM and generator change. Cardiology conuslted for ACS and is planned for [facility]. Course c/b by AMS.    Plan:  1. Chest Pain - interrogation no AICD shocks or arrhthymias suspect ACS   2. ICM s/p MDT BiV AICD  3. CAD s/p CABG and stents    - Continuous telemetric monitoring  - Monitor electrolytes and replete K to 4 and Mg to 2  - GDMT and Ischemic workup per cardiology- tentatively plan for LHC today   - Plan for ICD downgrade to BiV PPM when medical stable  - Continue care per primary team     Wendy Ramirez PA-C  Patient to be staff with attending. Please await attending addendum

## 2024-01-17 NOTE — PROGRESS NOTE ADULT - PROBLEM SELECTOR PLAN 2
Per niece has had intermittent confusion at baseline and suspected possible early signs of dementia. Possible vascular dementia given hx CVAs? Possible delirium in setting of acute illness  - dysphagia screen when more awake - possible neuro c/s in AM if does not improve  - will need official dementia neurocognitive testing as outpatient  - CTH with Small vessel and atrophic changes.  - low dose zyprexa prn, 1.25 mg   - 1:1 for safety

## 2024-01-17 NOTE — PROGRESS NOTE ADULT - PROBLEM SELECTOR PLAN 6
Unclear if hx of this as niece is unsure. Ambulates with walker  - c/w ASA, statin SBP dropped to 90s s/p sedation. Likely sedation effect and also noted to have multiple vomiting episodes prior to arrival per niece. Dry lips on exam.. Patient repositioned in bed and repeat 108/63. 500cc bolus as well.  - hold losartan, HCTZ  - c/w metoprolol with hold parameters  - on 75 cc/hr

## 2024-01-18 ENCOUNTER — TRANSCRIPTION ENCOUNTER (OUTPATIENT)
Age: 84
End: 2024-01-18

## 2024-01-18 DIAGNOSIS — Z20.828 CONTACT WITH AND (SUSPECTED) EXPOSURE TO OTHER VIRAL COMMUNICABLE DISEASES: ICD-10-CM

## 2024-01-18 DIAGNOSIS — R09.81 NASAL CONGESTION: ICD-10-CM

## 2024-01-18 LAB
ANION GAP SERPL CALC-SCNC: 9 MMOL/L — SIGNIFICANT CHANGE UP (ref 7–14)
APTT BLD: 69 SEC — HIGH (ref 24.5–35.6)
BUN SERPL-MCNC: 12 MG/DL — SIGNIFICANT CHANGE UP (ref 7–23)
CALCIUM SERPL-MCNC: 9.2 MG/DL — SIGNIFICANT CHANGE UP (ref 8.4–10.5)
CHLORIDE SERPL-SCNC: 100 MMOL/L — SIGNIFICANT CHANGE UP (ref 98–107)
CO2 SERPL-SCNC: 28 MMOL/L — SIGNIFICANT CHANGE UP (ref 22–31)
CREAT SERPL-MCNC: 1.04 MG/DL — SIGNIFICANT CHANGE UP (ref 0.5–1.3)
EGFR: 71 ML/MIN/1.73M2 — SIGNIFICANT CHANGE UP
GLUCOSE BLDC GLUCOMTR-MCNC: 101 MG/DL — HIGH (ref 70–99)
GLUCOSE BLDC GLUCOMTR-MCNC: 125 MG/DL — HIGH (ref 70–99)
GLUCOSE BLDC GLUCOMTR-MCNC: 131 MG/DL — HIGH (ref 70–99)
GLUCOSE BLDC GLUCOMTR-MCNC: 131 MG/DL — HIGH (ref 70–99)
GLUCOSE BLDC GLUCOMTR-MCNC: 134 MG/DL — HIGH (ref 70–99)
GLUCOSE BLDC GLUCOMTR-MCNC: 150 MG/DL — HIGH (ref 70–99)
GLUCOSE SERPL-MCNC: 137 MG/DL — HIGH (ref 70–99)
HCT VFR BLD CALC: 42.6 % — SIGNIFICANT CHANGE UP (ref 39–50)
HGB BLD-MCNC: 14.7 G/DL — SIGNIFICANT CHANGE UP (ref 13–17)
MAGNESIUM SERPL-MCNC: 2 MG/DL — SIGNIFICANT CHANGE UP (ref 1.6–2.6)
MCHC RBC-ENTMCNC: 31.7 PG — SIGNIFICANT CHANGE UP (ref 27–34)
MCHC RBC-ENTMCNC: 34.5 GM/DL — SIGNIFICANT CHANGE UP (ref 32–36)
MCV RBC AUTO: 92 FL — SIGNIFICANT CHANGE UP (ref 80–100)
NRBC # BLD: 0 /100 WBCS — SIGNIFICANT CHANGE UP (ref 0–0)
NRBC # FLD: 0 K/UL — SIGNIFICANT CHANGE UP (ref 0–0)
PHOSPHATE SERPL-MCNC: 2.8 MG/DL — SIGNIFICANT CHANGE UP (ref 2.5–4.5)
PLATELET # BLD AUTO: 218 K/UL — SIGNIFICANT CHANGE UP (ref 150–400)
POTASSIUM SERPL-MCNC: 4.1 MMOL/L — SIGNIFICANT CHANGE UP (ref 3.5–5.3)
POTASSIUM SERPL-SCNC: 4.1 MMOL/L — SIGNIFICANT CHANGE UP (ref 3.5–5.3)
RBC # BLD: 4.63 M/UL — SIGNIFICANT CHANGE UP (ref 4.2–5.8)
RBC # FLD: 12.9 % — SIGNIFICANT CHANGE UP (ref 10.3–14.5)
SODIUM SERPL-SCNC: 137 MMOL/L — SIGNIFICANT CHANGE UP (ref 135–145)
WBC # BLD: 7.13 K/UL — SIGNIFICANT CHANGE UP (ref 3.8–10.5)
WBC # FLD AUTO: 7.13 K/UL — SIGNIFICANT CHANGE UP (ref 3.8–10.5)

## 2024-01-18 PROCEDURE — 99231 SBSQ HOSP IP/OBS SF/LOW 25: CPT

## 2024-01-18 PROCEDURE — 93010 ELECTROCARDIOGRAM REPORT: CPT

## 2024-01-18 PROCEDURE — 93458 L HRT ARTERY/VENTRICLE ANGIO: CPT | Mod: 26,59

## 2024-01-18 PROCEDURE — 99233 SBSQ HOSP IP/OBS HIGH 50: CPT

## 2024-01-18 PROCEDURE — 92941 PRQ TRLML REVSC TOT OCCL AMI: CPT | Mod: LC

## 2024-01-18 PROCEDURE — 99232 SBSQ HOSP IP/OBS MODERATE 35: CPT

## 2024-01-18 RX ORDER — LANOLIN ALCOHOL/MO/W.PET/CERES
6 CREAM (GRAM) TOPICAL AT BEDTIME
Refills: 0 | Status: DISCONTINUED | OUTPATIENT
Start: 2024-01-18 | End: 2024-01-22

## 2024-01-18 RX ADMIN — LATANOPROST 1 DROP(S): 0.05 SOLUTION/ DROPS OPHTHALMIC; TOPICAL at 23:41

## 2024-01-18 RX ADMIN — SODIUM CHLORIDE 45 MILLILITER(S): 9 INJECTION, SOLUTION INTRAVENOUS at 12:18

## 2024-01-18 RX ADMIN — HEPARIN SODIUM 750 UNIT(S)/HR: 5000 INJECTION INTRAVENOUS; SUBCUTANEOUS at 08:27

## 2024-01-18 RX ADMIN — ATORVASTATIN CALCIUM 40 MILLIGRAM(S): 80 TABLET, FILM COATED ORAL at 23:40

## 2024-01-18 RX ADMIN — OXYMETAZOLINE HYDROCHLORIDE 2 SPRAY(S): 0.5 SPRAY NASAL at 12:17

## 2024-01-18 RX ADMIN — Medication 50 MILLIGRAM(S): at 07:41

## 2024-01-18 RX ADMIN — Medication 1 DROP(S): at 07:46

## 2024-01-18 RX ADMIN — CLOPIDOGREL BISULFATE 75 MILLIGRAM(S): 75 TABLET, FILM COATED ORAL at 12:06

## 2024-01-18 RX ADMIN — Medication 6 MILLIGRAM(S): at 23:40

## 2024-01-18 RX ADMIN — HEPARIN SODIUM 750 UNIT(S)/HR: 5000 INJECTION INTRAVENOUS; SUBCUTANEOUS at 04:00

## 2024-01-18 RX ADMIN — HEPARIN SODIUM 750 UNIT(S)/HR: 5000 INJECTION INTRAVENOUS; SUBCUTANEOUS at 08:42

## 2024-01-18 RX ADMIN — SODIUM CHLORIDE 45 MILLILITER(S): 9 INJECTION, SOLUTION INTRAVENOUS at 04:03

## 2024-01-18 RX ADMIN — Medication 81 MILLIGRAM(S): at 12:05

## 2024-01-18 RX ADMIN — Medication 1 DROP(S): at 23:41

## 2024-01-18 NOTE — CHART NOTE - NSCHARTNOTEFT_GEN_A_CORE
CHASSAGNE, JEANCLAUDE  MRN-[id number]    Patient status post cath via right femoral artery. Site is clean, dry, intact without thrill or bruit present. Pulses present, capillary refill appropriate. Without hematoma. Will continue to  follow closely.

## 2024-01-18 NOTE — PROGRESS NOTE ADULT - PROBLEM SELECTOR PLAN 2
Per niece has had intermittent confusion at baseline and suspected possible early signs of dementia. Possible vascular dementia given hx CVAs? Possible delirium in setting of acute illness  - MS 1/17 still off  - MS 1/18 back to baseline, slightly forgetful but A&Ox3  - neurocognitive testing as outpatient  - CTH with small vessel and atrophic changes.  - low dose Zyprexa prn, 1.25 mg   - 1:1 for safety, if remains calm can dc

## 2024-01-18 NOTE — DISCHARGE NOTE PROVIDER - HOSPITAL COURSE
83M with PMHx early dementia? hx cardiomyopathy, s/p PPM in 2009, glaucoma, CVA 1993? (ambulates with walker), HTN, DM2, CABG x4 1995, cardiac stent 1998, prostate cancer tx with radioactive seeds 2004, colon cancer tx with colon resection 2015 presenting with epigastric pain and chest pain since 3AM night prior to admission. Upon my evaluation @ 8PM patient was previously given ativan 3mg and haldol 5mg for acute AMS while in ED and he is currently sedated and unable to provide history. The niece is at bedside and collateral obtained from prior ED provider notes, cards and EP consults. The patient had epigastric pain that radiated to the left shoulder and back since 3AM. He also had multiple episodes of vomiting on the way to the hospital per niece. He was scheduled to have his PPM generator replaced tomorrow with Dr. Alex Cruz. Per [facility]    Hospital Course:   In ED, troponins 99--158 and given plavix load, ASA load and started on heparin gtt.  In ED agitated and combative s/p ativan and haldol eventually transitioned to prn zyprexa, CTH is negative, MS improved.    TTE 1/18   1. The left ventricular cavity is normal. Left ventricular wall thickness is normal. Left ventricular systolic function is severely decreased with a calculated ejection fraction of 23 % by the Camacho's biplane method of disks. There is global left ventricular hypokinesis.   2. The right ventricular cavity is normal in size and normal systolic function. Tricuspid annular plane systolic excursion (TAPSE) is 2.2 cm (normal >=1.7 cm). A device lead is visualized in the right heart.   3. Structurally normal mitral valve with normal leaflet excursion. There is calcification of the mitral valve annulus. There is mild mitral regurgitation.   4. The aortic valve appears trileaflet with normal systolic excursion. There is calcification of the aortic valve leaflets. There is mild aortic regurgitation.   5. No prior echocardiogram is available for comparison.    [facility] 1/18        83M with PMHx early dementia? hx cardiomyopathy, s/p PPM in 2009, glaucoma, CVA 1993? (ambulates with walker), HTN, DM2, CABG x4 1995, cardiac stent 1998, prostate cancer tx with radioactive seeds 2004, colon cancer tx with colon resection 2015 presenting with epigastric pain and chest pain since 3AM night prior to admission.  Pt was admitted for     83M HTN, DM2, CAD/CABG/stent s/p PPM, CVA w/ gait disturbance; on RW, vascular dementia, Prostate CA s/p rad seeds, Colon CA s/p resection, glaucoma p/w NSTEMI s/p [facility] 1/18 with LOVE x2 c/b acute HFrEF c/b metabolic encephalopathy in a setting of baseline dementia. Pending PPM downgrade on Monday.    # NSTEMI (non-ST elevation myocardial infarction).- Troponin peaked at 1237.  s/p  [facility] 1/18: dLAD 80%, mRCA 100%, SVG to OM mid 90% x1 LOVE + distal 70% x1 LOVE. - LDL 75  CTH negative for ICH, Plan for  ASA and Plavix for 1 year,  Atorvastatin 40       # Acute systolic heart failure-  Currently euvolemic. TTE 1/15: left ventricular systolic function is severely decreased with a calculated ejection fraction of 23%  On  Metoprolol succinate 50 bid, Entresto 24-26 bid, Farxiga 10 daily. Cardiology on board.    #  Acute encephalopathy- Metabolic encephalopathy from NSTEMI and hospitalization - contributed by baseline vascular dementia  On Seroquel qHS, Avoid opioids, benzos, anticholinergics. Monitor off 1:1.    #  ICD (implantable cardioverter-defibrillator) in place- Initial plan was for  BiV AICD downgrade to PPM on 1/22. Pt is however on Farxiga which he received on day prior to procedure DW EP Dr Cruz- plan to do the procedure as outpt- EP to schedule appointment for FU.    #  Type 2 diabetes mellitus with hyperglycemia, without long-term current use of insulin- BS within acceptable goal. HbA1c is 6.5%. Hold metformin, can resume as outpt    #  HTN (hypertension)-  On meds as above.    # History of CVA (cerebrovascular accident).- Unclear if hx of this as niece is unsure. Ambulates with walker.  c/w ASA, statin,  suspect vascular dementia.    # Dispo- FU with outpt Cardiology.  Pt has appointment with his Cardiologist Dr Helen Moe on Feb 2nd  - DW EP- EP to schedule appointment for FU for AICD downgrade-

## 2024-01-18 NOTE — PROGRESS NOTE ADULT - SUBJECTIVE AND OBJECTIVE BOX
Subjective: Denies HA, lightheadedness, dizziness, CP, palpitation, SOB, abdominal pain, and N/V.      Interval Events:  - Presented with sudden onset of pressure like epigastric chest pain that radiating up to his chest and left shoulder. Labs were notable for troponins 99--158 and given plavix load, ASA load and started on heparin gtt  - EP was consulted and device interrogation revealed the device was at ROLY and had no events. Of note patient recently saw EP Dr. Cruz with interrogation revealing ROLY with plan for downgrade to BiV PPM and generator change. Interrogation at that time revealed high LV threshold 2.125V.   - Cardiology was consulted for ACS. LHC which was planned for 1/17/2024 was postponed d/t delirium   - Hospital course c/b AMS and patient was combative s/p haldol and ativan and stat CT which was unremarkable.    chief complaint:    Subjective: No new complaints. Denies HA, lightheadedness, dizziness, CP, palpitation, SOB, abdominal pain, and N/V.        MEDICATIONS  (STANDING):  aspirin enteric coated 81 milliGRAM(s) Oral daily  atorvastatin 40 milliGRAM(s) Oral at bedtime  clopidogrel Tablet 75 milliGRAM(s) Oral daily  dextrose 5% + sodium chloride 0.45%. 1000 milliLiter(s) (45 mL/Hr) IV Continuous <Continuous>  dextrose 50% Injectable 12.5 Gram(s) IV Push once  dextrose 50% Injectable 25 Gram(s) IV Push once  dextrose 50% Injectable 25 Gram(s) IV Push once  glucagon  Injectable 1 milliGRAM(s) IntraMuscular once  heparin  Infusion.  Unit(s)/Hr (7.5 mL/Hr) IV Continuous <Continuous>  influenza  Vaccine (HIGH DOSE) 0.7 milliLiter(s) IntraMuscular once  insulin lispro (ADMELOG) corrective regimen sliding scale   SubCutaneous three times a day before meals  insulin lispro (ADMELOG) corrective regimen sliding scale   SubCutaneous at bedtime  latanoprost 0.005% Ophthalmic Solution 1 Drop(s) Both EYES at bedtime  metoprolol succinate ER 50 milliGRAM(s) Oral daily  timolol 0.5% Solution 1 Drop(s) Both EYES two times a day    MEDICATIONS  (PRN):  dextrose Oral Gel 15 Gram(s) Oral once PRN Blood Glucose LESS THAN 70 milliGRAM(s)/deciliter  heparin   Injectable 3800 Unit(s) IV Push every 6 hours PRN For aPTT less than 40  OLANZapine Injectable 1.25 milliGRAM(s) IntraMuscular every 6 hours PRN agitation  oxymetazoline 0.05% Nasal Spray 2 Spray(s) Both Nostrils two times a day PRN Congestion      Vital Signs Last 24 Hrs  T(C): 36.8 (18 Jan 2024 05:45), Max: 36.8 (18 Jan 2024 05:45)  T(F): 98.3 (18 Jan 2024 05:45), Max: 98.3 (18 Jan 2024 05:45)  HR: 82 (18 Jan 2024 07:40) (74 - 82)  BP: 121/82 (18 Jan 2024 07:40) (100/44 - 127/86)  BP(mean): 85 (18 Jan 2024 07:40) (85 - 85)  RR: 18 (18 Jan 2024 07:40) (16 - 20)  SpO2: 98% (18 Jan 2024 07:40) (96% - 100%)    Parameters below as of 18 Jan 2024 07:40  Patient On (Oxygen Delivery Method): room air      I&O's Detail    Physical Exam:  GENERAL: Lying in bed,  in NAD, stupor, took of several attempt of sternal rub to briefly arousal patient.   HEART: S1S2 RRR  PULMONARY: CTABL anteriorly, normal respiratory effort  ABDOMEN: + Bowel sounds present, soft, NDNT  EXTREMITIES:  Warm, well -perfused, no pedal edema, distal pulses present    TELEMETERIC: AV paced HR 70s, with some PVC and rare bigeminy                           14.7   7.13  )-----------( 218      ( 18 Jan 2024 06:14 )             42.6     PT/INR - ( 16 Jan 2024 16:16 )   PT: 10.9 sec;   INR: 0.97 ratio         PTT - ( 18 Jan 2024 06:14 )  PTT:69.0 sec  01-18    137  |  100  |  12  ----------------------------<  137<H>  4.1   |  28  |  1.04    Ca    9.2      18 Jan 2024 06:14  Phos  2.8     01-18  Mg     2.00     01-18    TPro  6.1  /  Alb  3.5  /  TBili  0.8  /  DBili  x   /  AST  72<H>  /  ALT  11  /  AlkPhos  51  01-16    CARDIAC MARKERS ( 17 Jan 2024 06:58 )  x     / x     / 803 U/L / x     / 148.6 ng/mL  CARDIAC MARKERS ( 16 Jan 2024 21:56 )  x     / x     / 584 U/L / x     / 105.7 ng/mL  CARDIAC MARKERS ( 16 Jan 2024 12:49 )  x     / x     / 190 U/L / x     / 24.8 ng/mL                 Subjective: Denies HA, lightheadedness, dizziness, CP, palpitation, SOB, abdominal pain, and N/V.      Interval Events:  - Presented with sudden onset of pressure like epigastric chest pain that radiating up to his chest and left shoulder. Labs were notable for troponins 99--158 and given plavix load, ASA load and started on heparin gtt  - EP was consulted and device interrogation revealed the device was at ROLY and had no events. Of note patient recently saw EP Dr. Cruz with interrogation revealing ROLY with plan for downgrade to BiV PPM and generator change. Interrogation at that time revealed high LV threshold 2.125V.   - Cardiology was consulted for ACS. LHC which was planned for 1/17/2024 was postponed d/t delirium   - Hospital course c/b AMS and patient was combative s/p haldol and ativan and stat CT which was unremarkable.  - Today, patient was AOx3 (person, place and time). Patient was able to tell me why his BiV ICD is being downgrade and the risk associated with the procedure. Patient's niece was present during this conversation. The alternatives, risks, and benefits were explained in detail which included but not limited to bleeding requiring transfusion, infection, stroke, plural effusion, esophageal injury, pericardial effusion, cardiac tamponade requiring chest tube, intubation, and death. Patient and niece expressed understanding and all questions were answered. Patient was consented which was also witness by his niece.     MEDICATIONS  (STANDING):  aspirin enteric coated 81 milliGRAM(s) Oral daily  atorvastatin 40 milliGRAM(s) Oral at bedtime  clopidogrel Tablet 75 milliGRAM(s) Oral daily  dextrose 5% + sodium chloride 0.45%. 1000 milliLiter(s) (45 mL/Hr) IV Continuous <Continuous>  dextrose 50% Injectable 12.5 Gram(s) IV Push once  dextrose 50% Injectable 25 Gram(s) IV Push once  dextrose 50% Injectable 25 Gram(s) IV Push once  glucagon  Injectable 1 milliGRAM(s) IntraMuscular once  heparin  Infusion.  Unit(s)/Hr (7.5 mL/Hr) IV Continuous <Continuous>  influenza  Vaccine (HIGH DOSE) 0.7 milliLiter(s) IntraMuscular once  insulin lispro (ADMELOG) corrective regimen sliding scale   SubCutaneous three times a day before meals  insulin lispro (ADMELOG) corrective regimen sliding scale   SubCutaneous at bedtime  latanoprost 0.005% Ophthalmic Solution 1 Drop(s) Both EYES at bedtime  metoprolol succinate ER 50 milliGRAM(s) Oral daily  timolol 0.5% Solution 1 Drop(s) Both EYES two times a day    MEDICATIONS  (PRN):  dextrose Oral Gel 15 Gram(s) Oral once PRN Blood Glucose LESS THAN 70 milliGRAM(s)/deciliter  heparin   Injectable 3800 Unit(s) IV Push every 6 hours PRN For aPTT less than 40  OLANZapine Injectable 1.25 milliGRAM(s) IntraMuscular every 6 hours PRN agitation  oxymetazoline 0.05% Nasal Spray 2 Spray(s) Both Nostrils two times a day PRN Congestion      Vital Signs Last 24 Hrs  T(C): 36.8 (18 Jan 2024 05:45), Max: 36.8 (18 Jan 2024 05:45)  T(F): 98.3 (18 Jan 2024 05:45), Max: 98.3 (18 Jan 2024 05:45)  HR: 82 (18 Jan 2024 07:40) (74 - 82)  BP: 121/82 (18 Jan 2024 07:40) (100/44 - 127/86)  BP(mean): 85 (18 Jan 2024 07:40) (85 - 85)  RR: 18 (18 Jan 2024 07:40) (16 - 20)  SpO2: 98% (18 Jan 2024 07:40) (96% - 100%)    Parameters below as of 18 Jan 2024 07:40  Patient On (Oxygen Delivery Method): room air      I&O's Detail    Physical Exam:  GENERAL: Lying in bed,  in NAD, pleasant   HEART: S1S2 RRR  PULMONARY: CTABL anteriorly, normal respiratory effort  ABDOMEN: + Bowel sounds present, soft, NDNT  EXTREMITIES:  Warm, well -perfused, no pedal edema, distal pulses present  Neurology, AOx4 (person, place, time and situation)     TELEMETERIC: AV paced HR 70s, with some PVC                           14.7   7.13  )-----------( 218      ( 18 Jan 2024 06:14 )             42.6     PT/INR - ( 16 Jan 2024 16:16 )   PT: 10.9 sec;   INR: 0.97 ratio         PTT - ( 18 Jan 2024 06:14 )  PTT:69.0 sec  01-18    137  |  100  |  12  ----------------------------<  137<H>  4.1   |  28  |  1.04    Ca    9.2      18 Jan 2024 06:14  Phos  2.8     01-18  Mg     2.00     01-18    TPro  6.1  /  Alb  3.5  /  TBili  0.8  /  DBili  x   /  AST  72<H>  /  ALT  11  /  AlkPhos  51  01-16    CARDIAC MARKERS ( 17 Jan 2024 06:58 )  x     / x     / 803 U/L / x     / 148.6 ng/mL  CARDIAC MARKERS ( 16 Jan 2024 21:56 )  x     / x     / 584 U/L / x     / 105.7 ng/mL  CARDIAC MARKERS ( 16 Jan 2024 12:49 )  x     / x     / 190 U/L / x     / 24.8 ng/mL

## 2024-01-18 NOTE — DISCHARGE NOTE PROVIDER - NSDCMRMEDTOKEN_GEN_ALL_CORE_FT
Afrin 0.05% nasal spray: 2 spray(s) in each nostril 2 times a day as needed  aspirin 81 mg oral delayed release tablet: 1 tab(s) orally once a day  hydroCHLOROthiazide 25 mg oral tablet: 1 tab(s) orally once a day  latanoprost 0.005% ophthalmic solution: 1 drop(s) in each eye once a day (at bedtime)  losartan 50 mg oral tablet: 1 tab(s) orally once a day  metFORMIN 850 mg oral tablet: 1 tab(s) orally 2 times a day  metoprolol succinate 50 mg oral tablet, extended release: 1 tab(s) orally once a day  Plavix 75 mg oral tablet: 1 tab(s) orally once a day  timolol maleate 0.5% ophthalmic solution: 1 drop(s) in each affected eye 2 times a day   aspirin 81 mg oral delayed release tablet: 1 tab(s) orally once a day  atorvastatin 40 mg oral tablet: 1 tab(s) orally once a day (at bedtime)  Farxiga 10 mg oral tablet: 1 tab(s) orally once a day  Farxiga 10 mg oral tablet: 1 tab(s) orally once a day  latanoprost 0.005% ophthalmic solution: 1 drop(s) in each eye once a day (at bedtime)  metFORMIN 850 mg oral tablet: 1 tab(s) orally 2 times a day  metoprolol succinate 50 mg oral tablet, extended release: 1 tab(s) orally 2 times a day  Plavix 75 mg oral tablet: 1 tab(s) orally once a day  QUEtiapine 25 mg oral tablet: 0.5 tab(s) orally once a day (at bedtime)  sacubitril-valsartan 24 mg-26 mg oral tablet: 1 tab(s) orally 2 times a day  sacubitril-valsartan 24 mg-26 mg oral tablet: 1 tab(s) orally 2 times a day  timolol maleate 0.5% ophthalmic solution: 1 drop(s) in each affected eye 2 times a day

## 2024-01-18 NOTE — PROGRESS NOTE ADULT - PROBLEM SELECTOR PLAN 4
- EP following, ICD downgrade to BiV PPM when medical stable--unclear if still indicated in light of low EF, f/u EP recs

## 2024-01-18 NOTE — PROGRESS NOTE ADULT - PROBLEM SELECTOR PLAN 6
SBP dropped to 90s s/p sedation. Likely sedation effect and also noted to have multiple vomiting episodes prior to arrival per niece. Dry lips on exam.. Patient repositioned in bed and repeat 108/63. 500cc bolus as well.  - hold losartan, HCTZ  - c/w metoprolol with hold parameters  - on IVF, dc once back on dc

## 2024-01-18 NOTE — DISCHARGE NOTE PROVIDER - NSDCCPCAREPLAN_GEN_ALL_CORE_FT
PRINCIPAL DISCHARGE DIAGNOSIS  Diagnosis: NSTEMI (non-ST elevation myocardial infarction)  Assessment and Plan of Treatment:       SECONDARY DISCHARGE DIAGNOSES  Diagnosis: Acute encephalopathy  Assessment and Plan of Treatment:      PRINCIPAL DISCHARGE DIAGNOSIS  Diagnosis: NSTEMI (non-ST elevation myocardial infarction)  Assessment and Plan of Treatment: You has a cardiac cath with stents placed. Please continue taking your medicaitons as prescibed and follow up with your Cardiologist      SECONDARY DISCHARGE DIAGNOSES  Diagnosis: ICD (implantable cardioverter-defibrillator) in place  Assessment and Plan of Treatment: The initial plan was to downgrade your AICD to PPM on 1/22. You were however on a mdeication called  Farxiga which you  received on day prior to procedure  As per discussion with electrophysiology,  eplan is now to get this procedure as outpt.    Diagnosis: Type 2 diabetes mellitus with hyperglycemia, without long-term current use of insulin  Assessment and Plan of Treatment: Your blood sugars were within acceptable range. Please adhere to low carb diet and follw up with your PCP    Diagnosis: Acute encephalopathy  Assessment and Plan of Treatment: You were agitated during the admission and receievd  medicaiton for it. Your CT head did not show acute findings

## 2024-01-18 NOTE — PROGRESS NOTE ADULT - SUBJECTIVE AND OBJECTIVE BOX
Patient is a 83y old  Male who presents with a chief complaint of left sided chest pain and epigastric pain x1 day (18 Jan 2024 10:20)    SUBJECTIVE / OVERNIGHT EVENTS:    No CP, SOB, f/c/n/v  states he was never confused     MEDICATIONS  (STANDING):  aspirin enteric coated 81 milliGRAM(s) Oral daily  atorvastatin 40 milliGRAM(s) Oral at bedtime  clopidogrel Tablet 75 milliGRAM(s) Oral daily  heparin  Infusion.  Unit(s)/Hr (7.5 mL/Hr) IV Continuous <Continuous>  influenza  Vaccine (HIGH DOSE) 0.7 milliLiter(s) IntraMuscular once  insulin lispro (ADMELOG) corrective regimen sliding scale   SubCutaneous three times a day before meals  insulin lispro (ADMELOG) corrective regimen sliding scale   SubCutaneous at bedtime  latanoprost 0.005% Ophthalmic Solution 1 Drop(s) Both EYES at bedtime  metoprolol succinate ER 50 milliGRAM(s) Oral daily  timolol 0.5% Solution 1 Drop(s) Both EYES two times a day    MEDICATIONS  (PRN):  dextrose Oral Gel 15 Gram(s) Oral once PRN Blood Glucose LESS THAN 70 milliGRAM(s)/deciliter  heparin   Injectable 3800 Unit(s) IV Push every 6 hours PRN For aPTT less than 40  OLANZapine Injectable 1.25 milliGRAM(s) IntraMuscular every 6 hours PRN agitation  oxymetazoline 0.05% Nasal Spray 2 Spray(s) Both Nostrils two times a day PRN Congestion    T(C): 36.8 (01-18-24 @ 05:45), Max: 36.8 (01-18-24 @ 05:45)  HR: 81 (01-18-24 @ 13:45) (74 - 82)  BP: 111/61 (01-18-24 @ 13:45) (100/44 - 127/86)  RR: 21 (01-18-24 @ 13:45) (16 - 21)  SpO2: 100% (01-18-24 @ 13:45) (96% - 100%)    CAPILLARY BLOOD GLUCOSE  POCT Blood Glucose.: 134 mg/dL (18 Jan 2024 14:23)  POCT Blood Glucose.: 131 mg/dL (18 Jan 2024 12:46)  POCT Blood Glucose.: 131 mg/dL (18 Jan 2024 12:12)  POCT Blood Glucose.: 150 mg/dL (18 Jan 2024 10:30)  POCT Blood Glucose.: 125 mg/dL (18 Jan 2024 08:55)  POCT Blood Glucose.: 151 mg/dL (17 Jan 2024 21:50)  POCT Blood Glucose.: 87 mg/dL (17 Jan 2024 17:02)    PHYSICAL EXAM:  GENERAL: NAD   CHEST/LUNG: Clear to auscultation bilaterally; No wheeze  HEART: Regular rate and rhythm; No murmurs, rubs, or gallops  ABDOMEN: Soft, Nontender, Nondistended; Bowel sounds present  EXTREMITIES:   warm and well perfused, No clubbing, cyanosis, or edema  PSYCH: AAOx3  NEUROLOGY: non-focal    LABS:                        14.7   7.13  )-----------( 218      ( 18 Jan 2024 06:14 )             42.6     01-18    137  |  100  |  12  ----------------------------<  137<H>  4.1   |  28  |  1.04    Ca    9.2      18 Jan 2024 06:14  Phos  2.8     01-18  Mg     2.00     01-18    TPro  6.1  /  Alb  3.5  /  TBili  0.8  /  DBili  x   /  AST  72<H>  /  ALT  11  /  AlkPhos  51  01-16    PT/INR - ( 16 Jan 2024 16:16 )   PT: 10.9 sec;   INR: 0.97 ratio    PTT - ( 18 Jan 2024 06:14 )  PTT:69.0 sec    CARDIAC MARKERS ( 17 Jan 2024 06:58 )  x     / x     / 803 U/L / x     / 148.6 ng/mL  CARDIAC MARKERS ( 16 Jan 2024 21:56 )  x     / x     / 584 U/L / x     / 105.7 ng/mL    TTE 1/18    1. The left ventricular cavity is normal. Left ventricular wall thickness is normal. Left ventricular systolic function is severely decreased with a calculated ejection fraction of 23 % by the Camacho's biplane method of disks. There is global left ventricular hypokinesis.   2. The right ventricular cavity is normal in size and normal systolic function. Tricuspid annular plane systolic excursion (TAPSE) is 2.2 cm (normal >=1.7 cm). A device lead is visualized in the right heart.   3. Structurally normal mitral valve with normal leaflet excursion. There is calcification of the mitral valve annulus. There is mild mitral regurgitation.   4. The aortic valve appears trileaflet with normal systolic excursion. There is calcification of the aortic valve leaflets. There is mild aortic regurgitation.   5. No prior echocardiogram is available for comparison.    Consultant(s) Notes Reviewed:    Care Discussed with Consultants/Other Providers:

## 2024-01-18 NOTE — PROGRESS NOTE ADULT - ASSESSMENT
83M with PMH of ICM s/p MDT CRT-D, CAD s/p CABG 95' and stents, HTN, T2DM, prostate cancer presenting with chest pain and rising troponins concerning for NSTEMI. Will need ischemic evaluation with LHC. Course c/b by sundowning, now improving delirium. Troponins peaked at 1K.     Recs:  -c/w ASA, plavix  -OK to stop heparin gtt after 48h  -c/w metop succ 50mg, atorva 40mg  -obtain TTE  -plan for LHC this afternoon, continue NPO 83M with PMH of ICM s/p MDT CRT-D, CAD s/p CABG 95' and stents, HTN, T2DM, prostate cancer presenting with chest pain and rising troponins concerning for NSTEMI. Will need ischemic evaluation with LHC. Course c/b by sundowning, now improving delirium. Troponins peaked at 1K. TTE w/ EF 23%.     Recs:  -c/w ASA, plavix  -OK to stop heparin gtt after 48h  -c/w metop succ 50mg, atorva 40mg  -plan for LHC this afternoon, continue NPO

## 2024-01-18 NOTE — PROGRESS NOTE ADULT - ASSESSMENT
This is a 83 year old man with PMHx early dementia? hx cardiomyopathy, s/p MDT ICD, glaucoma, CVA 1993? (ambulates with walker), HTN, DM2, CABG x4 1995, cardiac stent 1998, prostate cancer tx with radioactive seeds 2004, colon cancer tx with colon resection 2015 presenting with epigastric chest pain and admitted for NSTEMI. Device interrogation revealed the device was at ROLY and had no events. Of note patient recently saw EP Dr. Cruz with interrogation revealing ROLY with plan for downgrade to BiV PPM and generator change. Cardiology conuslted for ACS and is planned for [facility]. Course c/b by AMS.    Impression  1. Chest Pain - interrogation no AICD shocks or arrhthymias suspect ACS   2. ICM s/p MDT BiV AICD  3. CAD s/p CABG and stents    - Continuous telemetric monitoring  - Monitor electrolytes and replete K to 4 and Mg to 2  - GDMT and Ischemic workup per cardiology- plan for LHC  - Plan for ICD downgrade to BiV PPM when medical stable  - Continue care per primary team     Wendy Ramirez PA-C  Patient to be staff with attending. Please await attending addendum    This is a 83 year old man with PMHx early dementia? hx cardiomyopathy, s/p MDT ICD, glaucoma, CVA 1993? (ambulates with walker), HTN, DM2, CABG x4 1995, cardiac stent 1998, prostate cancer tx with radioactive seeds 2004, colon cancer tx with colon resection 2015 presenting with epigastric chest pain and admitted for NSTEMI. Device interrogation revealed the device was at ROLY and had no events. Of note patient recently saw EP Dr. Cruz with interrogation revealing ROLY with plan for downgrade to BiV PPM and generator change.  The alternatives, risks, and benefits were explained in detail which included but not limited to bleeding requiring transfusion, infection, stroke, plural effusion, esophageal injury, pericardial effusion, cardiac tamponade requiring chest tube, intubation, and death. Patient expressed understanding and all questions were answered. Patient was AOx3 (person, place and time) and was able to explain why his BiV-ICD is being downgrade and the risk associated with the procedure. Patient's niece was present during this conversation and patient consented to the procedure. Cardiology consulted for ACS and is planned for [facility].     Impression  1. Chest Pain - interrogation no AICD shocks or arrhthymias suspect ACS   2. ICM s/p MDT BiV AICD  3. CAD s/p CABG and stents    Plan:  - Continuous telemetric monitoring  - Monitor electrolytes and replete K to 4 and Mg to 2  - F/U TTE  - GDMT and Ischemic workup per cardiology- plan for LHC today per cardiology  - Plan for BiV ICD downgrade to BiV PPM on Monday 1/22/2024  - Continue care per primary team     Wendy Ramirez PA-C  Patient to be staff with attending. Please await attending addendum

## 2024-01-18 NOTE — DISCHARGE NOTE PROVIDER - NSDCFUSCHEDAPPT_GEN_ALL_CORE_FT
[facility] Physician [facility] [phone]  Scheduled Appointment: 02/02/2024     [facility] Physician [facility] [phone]  Scheduled Appointment: 03/05/2024

## 2024-01-18 NOTE — PROGRESS NOTE ADULT - PROBLEM SELECTOR PLAN 9
- using afrin for years  - explained risk of afrin use (rebound congestion, also CV risk), states his CT surgeon when did his CABG state he could use it for rest of his life

## 2024-01-18 NOTE — DISCHARGE NOTE PROVIDER - CARE PROVIDER_API CALL
Alex Cruz.  Cardiovascular Disease  [address] [address] [place] [address], [address]  [place] [address]  Phone: [phone]  Fax: [phone]  Scheduled Appointment: 02/02/2024    Anna Sahh  Internal Medicine  [address] [place] [address]  Phone: [phone]  Fax: [phone]  Follow Up Time:

## 2024-01-18 NOTE — PROGRESS NOTE ADULT - SUBJECTIVE AND OBJECTIVE BOX
Patient seen and examined at bedside.    Overnight Events: Mental status improving    REVIEW OF SYSTEMS:  CONSTITUTIONAL: No weakness, fevers or chills  EYES/ENT: No visual changes;  No dysphagia  NECK: No pain or stiffness  RESPIRATORY: No cough, wheezing, hemoptysis; No shortness of breath  CARDIOVASCULAR: No chest pain or palpitations; No lower extremity edema  GASTROINTESTINAL: No abdominal or epigastric pain. No nausea, vomiting, or hematemesis; No diarrhea or constipation. No melena or hematochezia.  BACK: No back pain  GENITOURINARY: No dysuria, frequency or hematuria  NEUROLOGICAL: No numbness or weakness  SKIN: No itching, burning, rashes, or lesions   All other review of systems is negative unless indicated above.            Current Meds:  aspirin enteric coated 81 milliGRAM(s) Oral daily  atorvastatin 40 milliGRAM(s) Oral at bedtime  clopidogrel Tablet 75 milliGRAM(s) Oral daily  dextrose 5% + sodium chloride 0.45%. 1000 milliLiter(s) IV Continuous <Continuous>  dextrose 50% Injectable 25 Gram(s) IV Push once  dextrose 50% Injectable 25 Gram(s) IV Push once  dextrose 50% Injectable 12.5 Gram(s) IV Push once  dextrose Oral Gel 15 Gram(s) Oral once PRN  glucagon  Injectable 1 milliGRAM(s) IntraMuscular once  heparin   Injectable 3800 Unit(s) IV Push every 6 hours PRN  heparin  Infusion.  Unit(s)/Hr IV Continuous <Continuous>  influenza  Vaccine (HIGH DOSE) 0.7 milliLiter(s) IntraMuscular once  insulin lispro (ADMELOG) corrective regimen sliding scale   SubCutaneous three times a day before meals  insulin lispro (ADMELOG) corrective regimen sliding scale   SubCutaneous at bedtime  latanoprost 0.005% Ophthalmic Solution 1 Drop(s) Both EYES at bedtime  metoprolol succinate ER 50 milliGRAM(s) Oral daily  OLANZapine Injectable 1.25 milliGRAM(s) IntraMuscular every 6 hours PRN  oxymetazoline 0.05% Nasal Spray 2 Spray(s) Both Nostrils two times a day PRN  timolol 0.5% Solution 1 Drop(s) Both EYES two times a day      Vitals:  T(F): 98.3 (01-18), Max: 98.3 (01-18)  HR: 82 (01-18) (74 - 82)  BP: 121/82 (01-18) (100/44 - 127/86)  RR: 18 (01-18)  SpO2: 98% (01-18)  I&O's Summary      Physical Exam:  GEN: NAD  HEENT: EOMI, clear sclera  PULM: CTA b/l, no wheeze  CV: RRR S1 S2, no murmur, no JVD  ABD: S, NT, ND  EXT: WWP, no edema  PSYCH: normal affect  SKIN: No rash                          14.7   7.13  )-----------( 218      ( 18 Jan 2024 06:14 )             42.6     01-18    137  |  100  |  12  ----------------------------<  137<H>  4.1   |  28  |  1.04    Ca    9.2      18 Jan 2024 06:14  Phos  2.8     01-18  Mg     2.00     01-18    TPro  6.1  /  Alb  3.5  /  TBili  0.8  /  DBili  x   /  AST  72<H>  /  ALT  11  /  AlkPhos  51  01-16    PT/INR - ( 16 Jan 2024 16:16 )   PT: 10.9 sec;   INR: 0.97 ratio         PTT - ( 18 Jan 2024 06:14 )  PTT:69.0 sec  CARDIAC MARKERS ( 17 Jan 2024 06:58 )  x     / x     / 803 U/L / x     / 148.6 ng/mL  CARDIAC MARKERS ( 16 Jan 2024 21:56 )  x     / x     / 584 U/L / x     / 105.7 ng/mL  CARDIAC MARKERS ( 16 Jan 2024 12:49 )  x     / x     / 190 U/L / x     / 24.8 ng/mL

## 2024-01-19 DIAGNOSIS — I50.21 ACUTE SYSTOLIC (CONGESTIVE) HEART FAILURE: ICD-10-CM

## 2024-01-19 LAB
ANION GAP SERPL CALC-SCNC: 15 MMOL/L — HIGH (ref 7–14)
ANION GAP SERPL CALC-SCNC: 16 MMOL/L — HIGH (ref 7–14)
APTT BLD: 31.4 SEC — SIGNIFICANT CHANGE UP (ref 24.5–35.6)
BASOPHILS # BLD AUTO: 0.02 K/UL — SIGNIFICANT CHANGE UP (ref 0–0.2)
BASOPHILS NFR BLD AUTO: 0.2 % — SIGNIFICANT CHANGE UP (ref 0–2)
BUN SERPL-MCNC: 10 MG/DL — SIGNIFICANT CHANGE UP (ref 7–23)
BUN SERPL-MCNC: 11 MG/DL — SIGNIFICANT CHANGE UP (ref 7–23)
CALCIUM SERPL-MCNC: 9 MG/DL — SIGNIFICANT CHANGE UP (ref 8.4–10.5)
CALCIUM SERPL-MCNC: 9.4 MG/DL — SIGNIFICANT CHANGE UP (ref 8.4–10.5)
CHLORIDE SERPL-SCNC: 100 MMOL/L — SIGNIFICANT CHANGE UP (ref 98–107)
CHLORIDE SERPL-SCNC: 101 MMOL/L — SIGNIFICANT CHANGE UP (ref 98–107)
CO2 SERPL-SCNC: 20 MMOL/L — LOW (ref 22–31)
CO2 SERPL-SCNC: 21 MMOL/L — LOW (ref 22–31)
CREAT SERPL-MCNC: 0.92 MG/DL — SIGNIFICANT CHANGE UP (ref 0.5–1.3)
CREAT SERPL-MCNC: 0.94 MG/DL — SIGNIFICANT CHANGE UP (ref 0.5–1.3)
EGFR: 80 ML/MIN/1.73M2 — SIGNIFICANT CHANGE UP
EGFR: 83 ML/MIN/1.73M2 — SIGNIFICANT CHANGE UP
EOSINOPHIL # BLD AUTO: 0.05 K/UL — SIGNIFICANT CHANGE UP (ref 0–0.5)
EOSINOPHIL NFR BLD AUTO: 0.6 % — SIGNIFICANT CHANGE UP (ref 0–6)
FLUAV AG NPH QL: SIGNIFICANT CHANGE UP
FLUBV AG NPH QL: SIGNIFICANT CHANGE UP
GLUCOSE BLDC GLUCOMTR-MCNC: 107 MG/DL — HIGH (ref 70–99)
GLUCOSE SERPL-MCNC: 105 MG/DL — HIGH (ref 70–99)
GLUCOSE SERPL-MCNC: 94 MG/DL — SIGNIFICANT CHANGE UP (ref 70–99)
HCT VFR BLD CALC: 45 % — SIGNIFICANT CHANGE UP (ref 39–50)
HGB BLD-MCNC: 15.9 G/DL — SIGNIFICANT CHANGE UP (ref 13–17)
IANC: 6.13 K/UL — SIGNIFICANT CHANGE UP (ref 1.8–7.4)
IMM GRANULOCYTES NFR BLD AUTO: 0.4 % — SIGNIFICANT CHANGE UP (ref 0–0.9)
LYMPHOCYTES # BLD AUTO: 1.43 K/UL — SIGNIFICANT CHANGE UP (ref 1–3.3)
LYMPHOCYTES # BLD AUTO: 16.9 % — SIGNIFICANT CHANGE UP (ref 13–44)
MAGNESIUM SERPL-MCNC: 1.9 MG/DL — SIGNIFICANT CHANGE UP (ref 1.6–2.6)
MAGNESIUM SERPL-MCNC: 2 MG/DL — SIGNIFICANT CHANGE UP (ref 1.6–2.6)
MCHC RBC-ENTMCNC: 32.1 PG — SIGNIFICANT CHANGE UP (ref 27–34)
MCHC RBC-ENTMCNC: 35.3 GM/DL — SIGNIFICANT CHANGE UP (ref 32–36)
MCV RBC AUTO: 90.9 FL — SIGNIFICANT CHANGE UP (ref 80–100)
MONOCYTES # BLD AUTO: 0.81 K/UL — SIGNIFICANT CHANGE UP (ref 0–0.9)
MONOCYTES NFR BLD AUTO: 9.6 % — SIGNIFICANT CHANGE UP (ref 2–14)
NEUTROPHILS # BLD AUTO: 6.13 K/UL — SIGNIFICANT CHANGE UP (ref 1.8–7.4)
NEUTROPHILS NFR BLD AUTO: 72.3 % — SIGNIFICANT CHANGE UP (ref 43–77)
NRBC # BLD: 0 /100 WBCS — SIGNIFICANT CHANGE UP (ref 0–0)
NRBC # FLD: 0 K/UL — SIGNIFICANT CHANGE UP (ref 0–0)
PHOSPHATE SERPL-MCNC: 2.9 MG/DL — SIGNIFICANT CHANGE UP (ref 2.5–4.5)
PHOSPHATE SERPL-MCNC: 3.2 MG/DL — SIGNIFICANT CHANGE UP (ref 2.5–4.5)
PLATELET # BLD AUTO: 239 K/UL — SIGNIFICANT CHANGE UP (ref 150–400)
POTASSIUM SERPL-MCNC: 3.3 MMOL/L — LOW (ref 3.5–5.3)
POTASSIUM SERPL-MCNC: 3.7 MMOL/L — SIGNIFICANT CHANGE UP (ref 3.5–5.3)
POTASSIUM SERPL-SCNC: 3.3 MMOL/L — LOW (ref 3.5–5.3)
POTASSIUM SERPL-SCNC: 3.7 MMOL/L — SIGNIFICANT CHANGE UP (ref 3.5–5.3)
RBC # BLD: 4.95 M/UL — SIGNIFICANT CHANGE UP (ref 4.2–5.8)
RBC # FLD: 12.7 % — SIGNIFICANT CHANGE UP (ref 10.3–14.5)
RSV RNA NPH QL NAA+NON-PROBE: SIGNIFICANT CHANGE UP
SARS-COV-2 RNA SPEC QL NAA+PROBE: SIGNIFICANT CHANGE UP
SODIUM SERPL-SCNC: 136 MMOL/L — SIGNIFICANT CHANGE UP (ref 135–145)
SODIUM SERPL-SCNC: 137 MMOL/L — SIGNIFICANT CHANGE UP (ref 135–145)
WBC # BLD: 8.47 K/UL — SIGNIFICANT CHANGE UP (ref 3.8–10.5)
WBC # FLD AUTO: 8.47 K/UL — SIGNIFICANT CHANGE UP (ref 3.8–10.5)

## 2024-01-19 PROCEDURE — 99233 SBSQ HOSP IP/OBS HIGH 50: CPT

## 2024-01-19 PROCEDURE — 93010 ELECTROCARDIOGRAM REPORT: CPT

## 2024-01-19 PROCEDURE — 99232 SBSQ HOSP IP/OBS MODERATE 35: CPT

## 2024-01-19 PROCEDURE — 99231 SBSQ HOSP IP/OBS SF/LOW 25: CPT

## 2024-01-19 RX ORDER — LOSARTAN POTASSIUM 100 MG/1
25 TABLET, FILM COATED ORAL DAILY
Refills: 0 | Status: DISCONTINUED | OUTPATIENT
Start: 2024-01-19 | End: 2024-01-19

## 2024-01-19 RX ORDER — LOSARTAN POTASSIUM 100 MG/1
25 TABLET, FILM COATED ORAL DAILY
Refills: 0 | Status: DISCONTINUED | OUTPATIENT
Start: 2024-01-19 | End: 2024-01-20

## 2024-01-19 RX ORDER — ENOXAPARIN SODIUM 100 MG/ML
40 INJECTION SUBCUTANEOUS EVERY 24 HOURS
Refills: 0 | Status: COMPLETED | OUTPATIENT
Start: 2024-01-19 | End: 2024-01-21

## 2024-01-19 RX ORDER — POTASSIUM CHLORIDE 20 MEQ
40 PACKET (EA) ORAL ONCE
Refills: 0 | Status: COMPLETED | OUTPATIENT
Start: 2024-01-19 | End: 2024-01-19

## 2024-01-19 RX ORDER — QUETIAPINE FUMARATE 200 MG/1
12.5 TABLET, FILM COATED ORAL AT BEDTIME
Refills: 0 | Status: DISCONTINUED | OUTPATIENT
Start: 2024-01-19 | End: 2024-01-22

## 2024-01-19 RX ADMIN — LATANOPROST 1 DROP(S): 0.05 SOLUTION/ DROPS OPHTHALMIC; TOPICAL at 21:20

## 2024-01-19 RX ADMIN — QUETIAPINE FUMARATE 12.5 MILLIGRAM(S): 200 TABLET, FILM COATED ORAL at 21:21

## 2024-01-19 RX ADMIN — Medication 50 MILLIGRAM(S): at 05:33

## 2024-01-19 RX ADMIN — Medication 1 DROP(S): at 05:17

## 2024-01-19 RX ADMIN — ATORVASTATIN CALCIUM 40 MILLIGRAM(S): 80 TABLET, FILM COATED ORAL at 21:20

## 2024-01-19 RX ADMIN — Medication 40 MILLIEQUIVALENT(S): at 02:47

## 2024-01-19 RX ADMIN — Medication 81 MILLIGRAM(S): at 12:29

## 2024-01-19 RX ADMIN — CLOPIDOGREL BISULFATE 75 MILLIGRAM(S): 75 TABLET, FILM COATED ORAL at 12:29

## 2024-01-19 RX ADMIN — ENOXAPARIN SODIUM 40 MILLIGRAM(S): 100 INJECTION SUBCUTANEOUS at 12:29

## 2024-01-19 RX ADMIN — Medication 6 MILLIGRAM(S): at 21:21

## 2024-01-19 RX ADMIN — OLANZAPINE 1.25 MILLIGRAM(S): 15 TABLET, FILM COATED ORAL at 17:24

## 2024-01-19 RX ADMIN — Medication 1 DROP(S): at 18:08

## 2024-01-19 NOTE — PROGRESS NOTE ADULT - SUBJECTIVE AND OBJECTIVE BOX
Patient seen and examined at bedside.    Overnight Events: No chest pain    REVIEW OF SYSTEMS:  CONSTITUTIONAL: No weakness, fevers or chills  EYES/ENT: No visual changes;  No dysphagia  NECK: No pain or stiffness  RESPIRATORY: No cough, wheezing, hemoptysis; No shortness of breath  CARDIOVASCULAR: No chest pain or palpitations; No lower extremity edema  GASTROINTESTINAL: No abdominal or epigastric pain. No nausea, vomiting, or hematemesis; No diarrhea or constipation. No melena or hematochezia.  BACK: No back pain  GENITOURINARY: No dysuria, frequency or hematuria  NEUROLOGICAL: No numbness or weakness  SKIN: No itching, burning, rashes, or lesions   All other review of systems is negative unless indicated above.            Current Meds:  aspirin enteric coated 81 milliGRAM(s) Oral daily  atorvastatin 40 milliGRAM(s) Oral at bedtime  clopidogrel Tablet 75 milliGRAM(s) Oral daily  dextrose 5% + sodium chloride 0.45%. 1000 milliLiter(s) IV Continuous <Continuous>  dextrose 50% Injectable 12.5 Gram(s) IV Push once  dextrose 50% Injectable 25 Gram(s) IV Push once  dextrose 50% Injectable 25 Gram(s) IV Push once  dextrose Oral Gel 15 Gram(s) Oral once PRN  glucagon  Injectable 1 milliGRAM(s) IntraMuscular once  influenza  Vaccine (HIGH DOSE) 0.7 milliLiter(s) IntraMuscular once  insulin lispro (ADMELOG) corrective regimen sliding scale   SubCutaneous three times a day before meals  insulin lispro (ADMELOG) corrective regimen sliding scale   SubCutaneous at bedtime  latanoprost 0.005% Ophthalmic Solution 1 Drop(s) Both EYES at bedtime  melatonin 6 milliGRAM(s) Oral at bedtime  metoprolol succinate ER 50 milliGRAM(s) Oral daily  OLANZapine Injectable 1.25 milliGRAM(s) IntraMuscular every 6 hours PRN  oxymetazoline 0.05% Nasal Spray 2 Spray(s) Both Nostrils two times a day PRN  timolol 0.5% Solution 1 Drop(s) Both EYES two times a day      Vitals:  T(F): 98.3 (01-19), Max: 98.3 (01-19)  HR: 71 (01-19) (70 - 81)  BP: 115/77 (01-19) (111/61 - 121/79)  RR: 16 (01-19)  SpO2: 100% (01-19)  I&O's Summary    18 Jan 2024 07:01  -  19 Jan 2024 07:00  --------------------------------------------------------  IN: 0 mL / OUT: 450 mL / NET: -450 mL      Physical Exam:  GEN: NAD  HEENT: EOMI, clear sclera  PULM: CTA b/l, no wheeze  CV: RRR S1 S2, no murmur, no JVD  ABD: S, NT, ND  EXT: WWP, no edema  PSYCH: normal affect  SKIN: No rash                          15.9   8.47  )-----------( 239      ( 19 Jan 2024 06:07 )             45.0     01-19    136  |  100  |  11  ----------------------------<  94  3.7   |  20<L>  |  0.94    Ca    9.4      19 Jan 2024 06:07  Phos  3.2     01-19  Mg     2.00     01-19      PTT - ( 19 Jan 2024 06:07 )  PTT:31.4 sec

## 2024-01-19 NOTE — PHYSICAL THERAPY INITIAL EVALUATION ADULT - ADDITIONAL COMMENTS
Pt. reports ambulating with a straight cane as needed.    Pt. was left seated at edge of bed post PT Evaluation, no apparent distress, all lines intact, HR 87 bpm, PCA present, sister present. RN made aware of pt. status and participation in PT.

## 2024-01-19 NOTE — PHYSICAL THERAPY INITIAL EVALUATION ADULT - GENERAL OBSERVATIONS, REHAB EVAL
Consult received, chart reviewed. Patient received in bed, no apparent distress, +tele, PCA present.

## 2024-01-19 NOTE — PROGRESS NOTE ADULT - ASSESSMENT
This is a 83 year old man with PMHx early dementia? hx cardiomyopathy, s/p MDT ICD, glaucoma, CVA 1993? (ambulates with walker), HTN, DM2, CABG x4 1995, cardiac stent 1998, prostate cancer tx with radioactive seeds 2004, colon cancer tx with colon resection 2015 presenting with epigastric chest pain and admitted for NSTEMI. Device interrogation revealed the device was at ROLY and had no events. Of note patient recently saw EP Dr. Cruz with interrogation revealing ROLY with plan for downgrade to BiV PPM and generator change.  The alternatives, risks, and benefits were explained in detail which included but not limited to bleeding requiring transfusion, infection, stroke, plural effusion, esophageal injury, pericardial effusion, cardiac tamponade requiring chest tube, intubation, and death. Patient expressed understanding and all questions were answered. Patient was AOx3 (person, place and time) and was able to explain why his BiV-ICD is being downgraded and the risk associated with the procedure. Patient is s/p LHC yesterday dLAD 80%, mRCA 100%, SVG to OM mid 90% x1 LOVE + distal 70% x1 LOVE. Episodes of NSVT noted overnight up to 27 beats  1. Chest Pain - interrogation no AICD shocks or arrhthymias suspect ACS   2. ICM s/p MDT BiV AICD  3. CAD s/p CABG and stents    Plan:  - Continuous telemetry monitoring  - Monitor electrolytes and replete K to 4 and Mg to 2  - Continue BB and increase dose as BP tolerates  - Continue GDMT   - Plan for BiV ICD downgrade to BiV PPM on Monday 1/22/2024  - Continue care per primary team  This is a 83 year old man with PMHx early dementia? hx cardiomyopathy, s/p MDT ICD, glaucoma, CVA 1993? (ambulates with walker), HTN, DM2, CABG x4 1995, cardiac stent 1998, prostate cancer tx with radioactive seeds 2004, colon cancer tx with colon resection 2015 presenting with epigastric chest pain and admitted for NSTEMI. Device interrogation revealed the device was at ROLY and had no events. Of note patient recently saw EP Dr. Cruz with interrogation revealing ROLY with plan for downgrade to BiV PPM and generator change.  The alternatives, risks, and benefits were explained in detail which included but not limited to bleeding requiring transfusion, infection, stroke, plural effusion, esophageal injury, pericardial effusion, cardiac tamponade requiring chest tube, intubation, and death. Patient expressed understanding and all questions were answered. Patient was AOx3 (person, place and time) and was able to explain why his BiV-ICD is being downgraded and the risk associated with the procedure. Patient is s/p LHC yesterday dLAD 80%, mRCA 100%, SVG to OM mid 90% x1 LOVE + distal 70% x1 LOVE. Episodes of AIVR noted overnight up to 27 beats  1. Chest Pain - interrogation no AICD shocks or arrhthymias suspect ACS   2. ICM s/p MDT BiV AICD  3. CAD s/p CABG and stents    Plan:  - Continuous telemetry monitoring  - Monitor electrolytes and replete K to 4 and Mg to 2  - Continue BB and increase dose as BP tolerates  - Continue GDMT   - Plan for BiV ICD downgrade to BiV PPM on Monday 1/22/2024  - Continue care per primary team

## 2024-01-19 NOTE — PROGRESS NOTE ADULT - PROBLEM SELECTOR PLAN 7
Unclear if hx of this as niece is unsure. Ambulates with walker  - c/w ASA, statin  - suspect vascular dementia

## 2024-01-19 NOTE — PHYSICAL THERAPY INITIAL EVALUATION ADULT - LIVES WITH, PROFILE
Pt. reports he is from [facility]. Pt. currently staying with his sister in an apartment. Has elevator access.

## 2024-01-19 NOTE — PHYSICAL THERAPY INITIAL EVALUATION ADULT - PERTINENT HX OF CURRENT PROBLEM, REHAB EVAL
Per documentation, pt. admitted with NSTEMI s/p [facility] 1/18 with LOVE x2 complicated by acute HFrEF and metabolic encephalopathy. EP planning for BiV AICD downgrade to PPM on 1/22.

## 2024-01-19 NOTE — PROGRESS NOTE ADULT - SUBJECTIVE AND OBJECTIVE BOX
Patient seen and examined at bedside. Denies any current physical complaints.   Right Femoral access checked:  site is soft, non tender, no hematoma. Peripheral pulses are intact    Overnight Events: Episodes of NSVT noted overnight up to 27 beats      REVIEW OF SYSTEMS:  Constitutional:     [ ] negative [ ] fevers [ ] chills [ ] weight loss [ ] weight gain  HEENT:                  [ ] negative [ ] dry eyes [ ] eye irritation [ ] postnasal drip [ ] nasal congestion  CV:                         [ ] negative  [ ] chest pain [ ] orthopnea [ ] palpitations [ ] murmur  Resp:                     [ ] negative [ ] cough [ ] shortness of breath [ ] dyspnea [ ] wheezing [ ] sputum [ ]hemoptysis  GI:                          [ ] negative [ ] nausea [ ] vomiting [ ] diarrhea [ ] constipation [ ] abd pain [ ] dysphagia   :                        [ ] negative [ ] dysuria [ ] nocturia [ ] hematuria [ ] increased urinary frequency  Musculoskeletal: [ ] negative [ ] back pain [ ] myalgias [ ] arthralgias [ ] fracture  Skin:                       [ ] negative [ ] rash [ ] itch  Neurological:        [ ] negative [ ] headache [ ] dizziness [ ] syncope [ ] weakness [ ] numbness  Psychiatric:           [ ] negative [ ] anxiety [ ] depression  Endocrine:            [ ] negative [ ] diabetes [ ] thyroid problem  Heme/Lymph:      [ ] negative [ ] anemia [ ] bleeding problem  Allergic/Immune: [ ] negative [ ] itchy eyes [ ] nasal discharge [ ] hives [ ] angioedema    [ ] All other systems negative      Current Meds:  aspirin enteric coated 81 milliGRAM(s) Oral daily  atorvastatin 40 milliGRAM(s) Oral at bedtime  clopidogrel Tablet 75 milliGRAM(s) Oral daily  dextrose 5% + sodium chloride 0.45%. 1000 milliLiter(s) IV Continuous <Continuous>  dextrose 50% Injectable 25 Gram(s) IV Push once  dextrose 50% Injectable 25 Gram(s) IV Push once  dextrose 50% Injectable 12.5 Gram(s) IV Push once  dextrose Oral Gel 15 Gram(s) Oral once PRN  enoxaparin Injectable 40 milliGRAM(s) SubCutaneous every 24 hours  glucagon  Injectable 1 milliGRAM(s) IntraMuscular once  influenza  Vaccine (HIGH DOSE) 0.7 milliLiter(s) IntraMuscular once  insulin lispro (ADMELOG) corrective regimen sliding scale   SubCutaneous three times a day before meals  insulin lispro (ADMELOG) corrective regimen sliding scale   SubCutaneous at bedtime  latanoprost 0.005% Ophthalmic Solution 1 Drop(s) Both EYES at bedtime  melatonin 6 milliGRAM(s) Oral at bedtime  metoprolol succinate ER 50 milliGRAM(s) Oral daily  OLANZapine Injectable 1.25 milliGRAM(s) IntraMuscular every 6 hours PRN  oxymetazoline 0.05% Nasal Spray 2 Spray(s) Both Nostrils two times a day PRN  timolol 0.5% Solution 1 Drop(s) Both EYES two times a day      PAST MEDICAL & SURGICAL HISTORY:  CAD (Coronary Artery Disease) (ICD9 414.00)      Acute Myocardial Infarction (ICD9 410.90)  10/93 and 2/96      Diabetes Mellitus Type 2 in Nonobese (ICD9 250.00)      History of Prostate Cancer (ICD9 V10.46)  S/P seed implant 2004      HTN (Hypertension) (ICD9 401.9)      Legal Blindness,  right eye  right eye      Left Ventricular Dysfunction (ICD9 428.1)  h/o      ACID/Pacer  [facility] model # [id number]      BPH (Benign Prostatic Hyperplasia)      Back Pain      Hypercholesteremia      Unspecified systolic (congestive) heart failure      Glaucoma      CVA (cerebrovascular accident)      Colon cancer      S/P Appendectomy  1962      S/P CABG X 4  1996      S/P Coronary Angiogram  1 stent placement 1998      Radium seed implants  12/2004      Cardiac Pacemaker,  Defibrillator  11/7/2009      AICD (automatic cardioverter/defibrillator) present  Medtronic      S/P colon resection          Vitals:  T(F): 98.3 (01-19), Max: 98.3 (01-19)  HR: 73 (01-19) (70 - 81)  BP: 127/76 (01-19) (111/61 - 127/76)  RR: 16 (01-19)  SpO2: 97% (01-19)  I&O's Summary    18 Jan 2024 07:01  -  19 Jan 2024 07:00  --------------------------------------------------------  IN: 0 mL / OUT: 450 mL / NET: -450 mL        Physical Exam:  Appearance: No acute distress; well appearing  Eyes: PERRL, EOMI, pink conjunctiva  HENT: Normal oral mucosa  Cardiovascular: RRR, S1, S2, no murmurs, rubs, or gallops; no edema; no JVD  Respiratory: Clear to auscultation bilaterally  Gastrointestinal: soft, non-tender, non-distended with normal bowel sounds  Musculoskeletal: No clubbing; no joint deformity   Neurologic: Non-focal  Lymphatic: No lymphadenopathy  Psychiatry: AAOx3, mood & affect appropriate  Skin: No rashes, ecchymoses, or cyanosis                          15.9   8.47  )-----------( 239      ( 19 Jan 2024 06:07 )             45.0     01-19    136  |  100  |  11  ----------------------------<  94  3.7   |  20<L>  |  0.94    Ca    9.4      19 Jan 2024 06:07  Phos  3.2     01-19  Mg     2.00     01-19      PTT - ( 19 Jan 2024 06:07 )  PTT:31.4 sec    New ECG(s): Personally reviewed    Echo: 1. The left ventricular cavity is normal. Left ventricular wall thickness is normal. Left ventricular systolic function is severely decreased with a calculated ejection fraction of 23 % by the Camacho's biplane method of disks. There is global left ventricular hypokinesis.   2. The right ventricular cavity is normal in size and normal systolic function. Tricuspid annular plane systolic excursion (TAPSE) is 2.2 cm (normal >=1.7 cm). A device lead is visualized in the right heart.   3. Structurally normal mitral valve with normal leaflet excursion. There is calcification of the mitral valve annulus. There is mild mitral regurgitation.   4. The aortic valve appears trileaflet with normal systolic excursion. There is calcification of the aortic valve leaflets. There is mild aortic regurgitation.   5. No prior echocardiogram is available for comparison.      s/p LHC via RFA:  dLAD 80%, mRCA 100%, SVG to OM mid 90% x1 LOVE + distal 70% x1 LOVE.       Patient seen and examined at bedside. Denies any current physical complaints.       Overnight Events: Episodes of NSVT noted overnight up to 27 beats      REVIEW OF SYSTEMS:  Constitutional:     [ ] negative [ ] fevers [ ] chills [ ] weight loss [ ] weight gain  HEENT:                  [ ] negative [ ] dry eyes [ ] eye irritation [ ] postnasal drip [ ] nasal congestion  CV:                         [ ] negative  [ ] chest pain [ ] orthopnea [ ] palpitations [ ] murmur  Resp:                     [ ] negative [ ] cough [ ] shortness of breath [ ] dyspnea [ ] wheezing [ ] sputum [ ]hemoptysis  GI:                          [ ] negative [ ] nausea [ ] vomiting [ ] diarrhea [ ] constipation [ ] abd pain [ ] dysphagia   :                        [ ] negative [ ] dysuria [ ] nocturia [ ] hematuria [ ] increased urinary frequency  Musculoskeletal: [ ] negative [ ] back pain [ ] myalgias [ ] arthralgias [ ] fracture  Skin:                       [ ] negative [ ] rash [ ] itch  Neurological:        [ ] negative [ ] headache [ ] dizziness [ ] syncope [ ] weakness [ ] numbness  Psychiatric:           [ ] negative [ ] anxiety [ ] depression  Endocrine:            [ ] negative [ ] diabetes [ ] thyroid problem  Heme/Lymph:      [ ] negative [ ] anemia [ ] bleeding problem  Allergic/Immune: [ ] negative [ ] itchy eyes [ ] nasal discharge [ ] hives [ ] angioedema    [ ] All other systems negative      Current Meds:  aspirin enteric coated 81 milliGRAM(s) Oral daily  atorvastatin 40 milliGRAM(s) Oral at bedtime  clopidogrel Tablet 75 milliGRAM(s) Oral daily  dextrose 5% + sodium chloride 0.45%. 1000 milliLiter(s) IV Continuous <Continuous>  dextrose 50% Injectable 25 Gram(s) IV Push once  dextrose 50% Injectable 25 Gram(s) IV Push once  dextrose 50% Injectable 12.5 Gram(s) IV Push once  dextrose Oral Gel 15 Gram(s) Oral once PRN  enoxaparin Injectable 40 milliGRAM(s) SubCutaneous every 24 hours  glucagon  Injectable 1 milliGRAM(s) IntraMuscular once  influenza  Vaccine (HIGH DOSE) 0.7 milliLiter(s) IntraMuscular once  insulin lispro (ADMELOG) corrective regimen sliding scale   SubCutaneous three times a day before meals  insulin lispro (ADMELOG) corrective regimen sliding scale   SubCutaneous at bedtime  latanoprost 0.005% Ophthalmic Solution 1 Drop(s) Both EYES at bedtime  melatonin 6 milliGRAM(s) Oral at bedtime  metoprolol succinate ER 50 milliGRAM(s) Oral daily  OLANZapine Injectable 1.25 milliGRAM(s) IntraMuscular every 6 hours PRN  oxymetazoline 0.05% Nasal Spray 2 Spray(s) Both Nostrils two times a day PRN  timolol 0.5% Solution 1 Drop(s) Both EYES two times a day      PAST MEDICAL & SURGICAL HISTORY:  CAD (Coronary Artery Disease) (ICD9 414.00)      Acute Myocardial Infarction (ICD9 410.90)  10/93 and 2/96      Diabetes Mellitus Type 2 in Nonobese (ICD9 250.00)      History of Prostate Cancer (ICD9 V10.46)  S/P seed implant 2004      HTN (Hypertension) (ICD9 401.9)      Legal Blindness,  right eye  right eye      Left Ventricular Dysfunction (ICD9 428.1)  h/o      ACID/Pacer  [employer]tr[employer] model # [id number]      BPH (Benign Prostatic Hyperplasia)      Back Pain      Hypercholesteremia      Unspecified systolic (congestive) heart failure      Glaucoma      CVA (cerebrovascular accident)      Colon cancer      S/P Appendectomy  1962      S/P CABG X 4  1996      S/P Coronary Angiogram  1 stent placement 1998      Radium seed implants  12/2004      Cardiac Pacemaker,  Defibrillator  11/7/2009      AICD (automatic cardioverter/defibrillator) present  [employer]tr[employer]      S/P colon resection          Vitals:  T(F): 98.3 (01-19), Max: 98.3 (01-19)  HR: 73 (01-19) (70 - 81)  BP: 127/76 (01-19) (111/61 - 127/76)  RR: 16 (01-19)  SpO2: 97% (01-19)  I&O's Summary    18 Jan 2024 07:01  -  19 Jan 2024 07:00  --------------------------------------------------------  IN: 0 mL / OUT: 450 mL / NET: -450 mL        Physical Exam:  Appearance: No acute distress; well appearing  Eyes: PERRL, EOMI, pink conjunctiva  HENT: Normal oral mucosa  Cardiovascular: RRR, S1, S2, no murmurs, rubs, or gallops; no edema; no JVD  Respiratory: Clear to auscultation bilaterally  Gastrointestinal: soft, non-tender, non-distended with normal bowel sounds  Musculoskeletal: No clubbing; no joint deformity   Neurologic: Non-focal  Lymphatic: No lymphadenopathy  Psychiatry: AAOx3, mood & affect appropriate  Skin: No rashes, ecchymoses, or cyanosis  Right Femoral access checked:  site is soft, non tender, no hematoma. Peripheral pulses are intact                          15.9   8.47  )-----------( 239      ( 19 Jan 2024 06:07 )             45.0     01-19    136  |  100  |  11  ----------------------------<  94  3.7   |  20<L>  |  0.94    Ca    9.4      19 Jan 2024 06:07  Phos  3.2     01-19  Mg     2.00     01-19      PTT - ( 19 Jan 2024 06:07 )  PTT:31.4 sec    New ECG(s): Personally reviewed    Echo: 1. The left ventricular cavity is normal. Left ventricular wall thickness is normal. Left ventricular systolic function is severely decreased with a calculated ejection fraction of 23 % by the Camacho's biplane method of disks. There is global left ventricular hypokinesis.   2. The right ventricular cavity is normal in size and normal systolic function. Tricuspid annular plane systolic excursion (TAPSE) is 2.2 cm (normal >=1.7 cm). A device lead is visualized in the right heart.   3. Structurally normal mitral valve with normal leaflet excursion. There is calcification of the mitral valve annulus. There is mild mitral regurgitation.   4. The aortic valve appears trileaflet with normal systolic excursion. There is calcification of the aortic valve leaflets. There is mild aortic regurgitation.   5. No prior echocardiogram is available for comparison.      s/p LHC via RFA:  dLAD 80%, mRCA 100%, SVG to OM mid 90% x1 LOVE + distal 70% x1 LOVE.

## 2024-01-19 NOTE — PROGRESS NOTE ADULT - TIME BILLING
Preparing to see the patient including review of tests and other providers' notes, confirming history with family member, performing medical examination and evaluation, counseling and educating the patient/family/caregiver, ordering medications, tests and procedures, communicating with other health care professionals, documenting clinical information in the EMR, independently interpreting results and communicating results to the patient/family/caregiven, care coordination.
Time spent includes direct patient care  (interview and examination of patient), discussion with other providers, support staff and/or patient's family members, review of medical records, ordering diagnostic tests and analyzing results, and documentation.
Time spent includes direct patient care  (interview and examination of patient), discussion with other providers, support staff and/or patient's family members, review of medical records, ordering diagnostic tests and analyzing results, and documentation.

## 2024-01-19 NOTE — PROGRESS NOTE ADULT - SUBJECTIVE AND OBJECTIVE BOX
[facility] Division of Hospital Medicine  Bob Jacob MD  Pager (JACKELIN-VAUGHN, 1R-5P): [phone]  Other Times:  [phone]    Patient is a 83y old  Male who presents with a chief complaint of left sided chest pain and epigastric pain x1 day (19 Jan 2024 10:27)    SUBJECTIVE / OVERNIGHT EVENTS:  Noted agitation overnight requiring 1:1 observation for patient safety.  This AM, patient is pleasant, unable to recall events from overnight.  No desire to harm self or others.  Oriented to person, time, and place. No F/C, N/V, CP, SOB, Cough, lightheadedness, dizziness, abdominal pain, diarrhea, dysuria.    MEDICATIONS  (STANDING):  aspirin enteric coated 81 milliGRAM(s) Oral daily  atorvastatin 40 milliGRAM(s) Oral at bedtime  clopidogrel Tablet 75 milliGRAM(s) Oral daily  dextrose 5% + sodium chloride 0.45%. 1000 milliLiter(s) (45 mL/Hr) IV Continuous <Continuous>  dextrose 50% Injectable 25 Gram(s) IV Push once  dextrose 50% Injectable 25 Gram(s) IV Push once  dextrose 50% Injectable 12.5 Gram(s) IV Push once  enoxaparin Injectable 40 milliGRAM(s) SubCutaneous every 24 hours  glucagon  Injectable 1 milliGRAM(s) IntraMuscular once  influenza  Vaccine (HIGH DOSE) 0.7 milliLiter(s) IntraMuscular once  insulin lispro (ADMELOG) corrective regimen sliding scale   SubCutaneous three times a day before meals  insulin lispro (ADMELOG) corrective regimen sliding scale   SubCutaneous at bedtime  latanoprost 0.005% Ophthalmic Solution 1 Drop(s) Both EYES at bedtime  melatonin 6 milliGRAM(s) Oral at bedtime  metoprolol succinate ER 50 milliGRAM(s) Oral daily  timolol 0.5% Solution 1 Drop(s) Both EYES two times a day    MEDICATIONS  (PRN):  dextrose Oral Gel 15 Gram(s) Oral once PRN Blood Glucose LESS THAN 70 milliGRAM(s)/deciliter  OLANZapine Injectable 1.25 milliGRAM(s) IntraMuscular every 6 hours PRN agitation  oxymetazoline 0.05% Nasal Spray 2 Spray(s) Both Nostrils two times a day PRN Congestion      Vital Signs Last 24 Hrs  T(C): 36.8 (19 Jan 2024 05:10), Max: 36.8 (18 Jan 2024 20:30)  T(F): 98.3 (19 Jan 2024 05:10), Max: 98.3 (19 Jan 2024 03:17)  HR: 73 (19 Jan 2024 09:22) (70 - 81)  BP: 127/76 (19 Jan 2024 09:22) (111/61 - 127/76)  BP(mean): 89 (19 Jan 2024 09:22) (89 - 89)  RR: 16 (19 Jan 2024 09:22) (16 - 21)  SpO2: 97% (19 Jan 2024 09:22) (97% - 100%)    Parameters below as of 19 Jan 2024 09:22  Patient On (Oxygen Delivery Method): room air      CAPILLARY BLOOD GLUCOSE      POCT Blood Glucose.: 107 mg/dL (19 Jan 2024 08:41)  POCT Blood Glucose.: 101 mg/dL (18 Jan 2024 22:43)  POCT Blood Glucose.: 134 mg/dL (18 Jan 2024 14:23)  POCT Blood Glucose.: 131 mg/dL (18 Jan 2024 12:46)  POCT Blood Glucose.: 131 mg/dL (18 Jan 2024 12:12)    I&O's Summary    18 Jan 2024 07:01  -  19 Jan 2024 07:00  --------------------------------------------------------  IN: 0 mL / OUT: 450 mL / NET: -450 mL        PHYSICAL EXAM:  CONSTITUTIONAL: NAD  EYES: PERRLA; conjunctiva and sclera clear  ENMT: Moist oral mucosa, no pharyngeal injection or exudates; normal dentition  NECK: Supple, no palpable masses; no thyromegaly  RESPIRATORY: Normal respiratory effort; lungs are clear to auscultation bilaterally  CARDIOVASCULAR: Regular rate and rhythm, normal S1 and S2, no murmur/rub/gallop; No lower extremity edema; Peripheral pulses are 2+ bilaterally  ABDOMEN: Nontender to palpation, normoactive bowel sounds, no rebound/guarding; No hepatosplenomegaly  MUSCULOSKELETAL:  Did not assess gait; no clubbing or cyanosis of digits; no joint swelling or tenderness to palpation  PSYCH: A+O to person, place, and time; affect appropriate  NEUROLOGY: CN 2-12 are intact and symmetric; no gross sensory deficits   SKIN: No rashes; no palpable lesions    LABS:                        15.9   8.47  )-----------( 239      ( 19 Jan 2024 06:07 )             45.0     01-19    136  |  100  |  11  ----------------------------<  94  3.7   |  20<L>  |  0.94    Ca    9.4      19 Jan 2024 06:07  Phos  3.2     01-19  Mg     2.00     01-19      PTT - ( 19 Jan 2024 06:07 )  PTT:31.4 sec      Urinalysis Basic - ( 19 Jan 2024 06:07 )    Color: x / Appearance: x / SG: x / pH: x  Gluc: 94 mg/dL / Ketone: x  / Bili: x / Urobili: x   Blood: x / Protein: x / Nitrite: x   Leuk Esterase: x / RBC: x / WBC x   Sq Epi: x / Non Sq Epi: x / Bacteria: x        RADIOLOGY & ADDITIONAL TESTS:    Imaging Personally Reviewed:    Care Discussed with Consultants/Other Providers:

## 2024-01-19 NOTE — PHYSICAL THERAPY INITIAL EVALUATION ADULT - THERAPY FREQUENCY, PT EVAL
Subjective   Patient ID: Osbaldo is a 1 week old male who is accompanied by:mother and father    Chief Complaint   Patient presents with   • Follow-up     1 week follow up.       12 day old presents for weight and rash recheck. Doing well. Formula and some breast milk. Comfortable. Voiding and stooling well. No new concerns.      Review of Systems   Constitutional: Negative.    HENT: Negative.    Eyes: Negative.    Respiratory: Negative.    Cardiovascular: Negative.    Gastrointestinal: Negative.    Genitourinary: Negative.    Musculoskeletal: Negative.    Skin: Positive for rash (scattered erythema, pustutes and thin dry skin flakes).   Neurological: Negative.    Hematological: Negative.        Patient's medications, allergies, past medical, surgical, social and family histories were reviewed and updated as appropriate.    Objective   Vitals:Temp 98.7 °F (37.1 °C) (Temporal)   Ht 20.5\" (52.1 cm)   Wt 3.73 kg (8 lb 3.6 oz)   BMI 13.76 kg/m²   BSA 0.22 m²   Physical Exam   Constitutional: He appears well-developed. He is active. He has a strong cry.   HENT:   Head: Anterior fontanelle is flat.   Right Ear: Tympanic membrane normal.   Left Ear: Tympanic membrane normal.   Nose: Nose normal.   Mouth/Throat: Mucous membranes are moist. Oropharynx is clear.   Eyes: Conjunctivae and EOM are normal. Red reflex is present bilaterally. Pupils are equal, round, and reactive to light.   Neck: Normal range of motion. Neck supple.   Cardiovascular: Normal rate, regular rhythm, S1 normal and S2 normal.   Pulmonary/Chest: Effort normal and breath sounds normal.   Abdominal: Soft. Bowel sounds are normal.   Musculoskeletal: Normal range of motion.   Neurological: He is alert. He has normal strength. Suck normal. Symmetric [place].   Skin: Skin is warm and dry. Capillary refill takes less than 2 seconds. Turgor is normal.   Occasional scattered erythema and pustules. Dry flaky skin on arms.    Vitals reviewed.      Assessment    Problem List Items Addressed This Visit        Integumentary    Transient  pustular melanosis    Erythema toxicum neonatorum       Other     weight loss - Primary        Weight gain!  Improvement of skin rash. Expect resolution. Resume feeding and care  Return for 1mo check-up  Instructed to call if problem worsens or does not improve within the next 24 hours otherwise follow-up prn   2-3x/week

## 2024-01-19 NOTE — PROGRESS NOTE ADULT - ASSESSMENT
83M with PMH of ICM s/p MDT CRT-D, CAD s/p CABG 95' and stents, HTN, T2DM, prostate cancer presenting with chest pain and rising troponins concerning for NSTEMI. Course c/b by sundowning, now improving delirium. Troponins peaked at 1K. TTE w/ EF 23%, normal RV. Now s/p LHC: dLAD 80%, mRCA 100%, SVG to OM mid 90% x1 LOVE + distal 70% x1 LOVE.    Recs:  -c/w ASA, plavix  -OK to stop heparin gtt after 48h  -c/w metop succ 50mg, atorva 40mg  -start losartan 25mg  -f/u EP recs regarding generator change

## 2024-01-19 NOTE — SWALLOW BEDSIDE ASSESSMENT ADULT - COMMENTS
As per H&P dated 1/16/24 "83M with PMHx early dementia? hx cardiomyopathy, s/p PPM in 2009, glaucoma, CVA 1993? (ambulates with walker), HTN, DM2, CABG x4 1995, cardiac stent 1998, prostate cancer tx with radioactive seeds 2004, colon cancer tx with colon resection 2015 presenting with epigastric pain and chest pain since 3AM night prior to admission. Admitted with NSTEMI. Course also c/b by AMS"    CTH 1/16/24 “IMPRESSION: No acute intracranial abnormalities. Small vessel and atrophic changes.”    CXR 1/16/24 "IMPRESSION: Clear lungs. Findings unchanged"    Patient visited at bedside for clinical swallow evaluation with sister present. Patient presents as awake and alert, able to follow 1-step directives and make basic wants/needs known.

## 2024-01-19 NOTE — PROGRESS NOTE ADULT - SUBJECTIVE AND OBJECTIVE BOX
Patient is seen and examined. denies any chest pain, SOB, palpitations or dizziness. Alert and oriented x3 now. Answers questions appropriately  PAST MEDICAL & SURGICAL HISTORY:  CAD (Coronary Artery Disease) (ICD9 414.00)    Acute Myocardial Infarction (ICD9 410.90)  10/93 and 2/96    Diabetes Mellitus Type 2 in Nonobese (ICD9 250.00)    History of Prostate Cancer (ICD9 V10.46)  S/P seed implant 2004    HTN (Hypertension) (ICD9 401.9)    Legal Blindness,  right eye  right eye    Left Ventricular Dysfunction (ICD9 428.1)  h/o    Cholecystitis, Unspecified (ICD9 575.10)  S/P Drain 9/09    S/P CABG X 4 (ICD9 V45.81)  1996    Coronary Stent (ICD9 V45.82)  1998 @ [facility]    History of Appendectomy (ICD9 V45.89)  1962    ACID/Pacer  Medtronic model # [id number]    BPH (Benign Prostatic Hyperplasia)    DM (Diabetes Mellitus)    Migraines    Prostate Ca  s/p seed implants 12/2004    Back Pain    Hypercholesteremia    Seasonal Allergies    Unspecified systolic (congestive) heart failure    Glaucoma    CVA (cerebrovascular accident)    Colon cancer    S/P Appendectomy  1962    S/P CABG X 4  1996    S/P Coronary Angiogram  1 stent placement 1998    Radium seed implants  12/2004    Cardiac Pacemaker,  Defibrillator  11/7/2009    AICD (automatic cardioverter/defibrillator) present  Medtronic    S/P colon resection        MEDICATIONS  (STANDING):  aspirin enteric coated 81 milliGRAM(s) Oral daily  atorvastatin 40 milliGRAM(s) Oral at bedtime  clopidogrel Tablet 75 milliGRAM(s) Oral daily  dextrose 5% + sodium chloride 0.45%. 1000 milliLiter(s) (45 mL/Hr) IV Continuous <Continuous>  dextrose 50% Injectable 25 Gram(s) IV Push once  dextrose 50% Injectable 25 Gram(s) IV Push once  dextrose 50% Injectable 12.5 Gram(s) IV Push once  enoxaparin Injectable 40 milliGRAM(s) SubCutaneous every 24 hours  glucagon  Injectable 1 milliGRAM(s) IntraMuscular once  influenza  Vaccine (HIGH DOSE) 0.7 milliLiter(s) IntraMuscular once  insulin lispro (ADMELOG) corrective regimen sliding scale   SubCutaneous three times a day before meals  insulin lispro (ADMELOG) corrective regimen sliding scale   SubCutaneous at bedtime  latanoprost 0.005% Ophthalmic Solution 1 Drop(s) Both EYES at bedtime  melatonin 6 milliGRAM(s) Oral at bedtime  metoprolol succinate ER 50 milliGRAM(s) Oral daily  timolol 0.5% Solution 1 Drop(s) Both EYES two times a day    MEDICATIONS  (PRN):  dextrose Oral Gel 15 Gram(s) Oral once PRN Blood Glucose LESS THAN 70 milliGRAM(s)/deciliter  OLANZapine Injectable 1.25 milliGRAM(s) IntraMuscular every 6 hours PRN agitation  oxymetazoline 0.05% Nasal Spray 2 Spray(s) Both Nostrils two times a day PRN Congestion    Vital Signs Last 24 Hrs  T(C): 36.8 (19 Jan 2024 05:10), Max: 36.8 (18 Jan 2024 20:30)  T(F): 98.3 (19 Jan 2024 05:10), Max: 98.3 (19 Jan 2024 03:17)  HR: 73 (19 Jan 2024 09:22) (70 - 81)  BP: 127/76 (19 Jan 2024 09:22) (111/61 - 127/76)  BP(mean): 89 (19 Jan 2024 09:22) (89 - 89)  RR: 16 (19 Jan 2024 09:22) (16 - 21)  SpO2: 97% (19 Jan 2024 09:22) (97% - 100%)    Parameters below as of 19 Jan 2024 09:22  Patient On (Oxygen Delivery Method): room air    INTERPRETATION OF TELEMETRY: Sinus rhythm with V pacing @ HR 70s-80s;     LABS:                        15.9   8.47  )-----------( 239      ( 19 Jan 2024 06:07 )             45.0     01-19    136  |  100  |  11  ----------------------------<  94  3.7   |  20<L>  |  0.94    Ca    9.4      19 Jan 2024 06:07  Phos  3.2     01-19  Mg     2.00     01-19          PTT - ( 19 Jan 2024 06:07 )  PTT:31.4 sec  Urinalysis Basic - ( 19 Jan 2024 06:07 )    Color: x / Appearance: x / SG: x / pH: x  Gluc: 94 mg/dL / Ketone: x  / Bili: x / Urobili: x   Blood: x / Protein: x / Nitrite: x   Leuk Esterase: x / RBC: x / WBC x   Sq Epi: x / Non Sq Epi: x / Bacteria: x      I&O's Summary    18 Jan 2024 07:01  -  19 Jan 2024 07:00  --------------------------------------------------------  IN: 0 mL / OUT: 450 mL / NET: -450 mL        PHYSICAL EXAM:    GENERAL: In no apparent distress, well nourished, and hydrated  HEART: Regular rate and rhythm; No murmurs, rubs, or gallops.  PULMONARY: Clear to auscultation and percussion.  No rales, wheezing, or rhonchi bilaterally.  ABDOMEN: Soft, Nontender, Nondistended; Bowel sounds present  EXTREMITIES:  2+ Peripheral Pulses, No clubbing, cyanosis, or edema           Patient is seen and examined. Denies any chest pain, SOB, palpitations or dizziness. Alert and oriented x3 now. Answers questions appropriately  PAST MEDICAL & SURGICAL HISTORY:  CAD (Coronary Artery Disease) (ICD9 414.00)    Acute Myocardial Infarction (ICD9 410.90)  10/93 and 2/96    Diabetes Mellitus Type 2 in Nonobese (ICD9 250.00)    History of Prostate Cancer (ICD9 V10.46)  S/P seed implant 2004    HTN (Hypertension) (ICD9 401.9)    Legal Blindness,  right eye  right eye    Left Ventricular Dysfunction (ICD9 428.1)  h/o    Cholecystitis, Unspecified (ICD9 575.10)  S/P Drain 9/09    S/P CABG X 4 (ICD9 V45.81)  1996    Coronary Stent (ICD9 V45.82)  1998 @ [facility]    History of Appendectomy (ICD9 V45.89)  1962    ACID/Pacer  Medtronic model # [id number]    BPH (Benign Prostatic Hyperplasia)    DM (Diabetes Mellitus)    Migraines    Prostate Ca  s/p seed implants 12/2004    Back Pain    Hypercholesteremia    Seasonal Allergies    Unspecified systolic (congestive) heart failure    Glaucoma    CVA (cerebrovascular accident)    Colon cancer    S/P Appendectomy  1962    S/P CABG X 4  1996    S/P Coronary Angiogram  1 stent placement 1998    Radium seed implants  12/2004    Cardiac Pacemaker,  Defibrillator  11/7/2009    AICD (automatic cardioverter/defibrillator) present  Medtronic    S/P colon resection        MEDICATIONS  (STANDING):  aspirin enteric coated 81 milliGRAM(s) Oral daily  atorvastatin 40 milliGRAM(s) Oral at bedtime  clopidogrel Tablet 75 milliGRAM(s) Oral daily  dextrose 5% + sodium chloride 0.45%. 1000 milliLiter(s) (45 mL/Hr) IV Continuous <Continuous>  dextrose 50% Injectable 25 Gram(s) IV Push once  dextrose 50% Injectable 25 Gram(s) IV Push once  dextrose 50% Injectable 12.5 Gram(s) IV Push once  enoxaparin Injectable 40 milliGRAM(s) SubCutaneous every 24 hours  glucagon  Injectable 1 milliGRAM(s) IntraMuscular once  influenza  Vaccine (HIGH DOSE) 0.7 milliLiter(s) IntraMuscular once  insulin lispro (ADMELOG) corrective regimen sliding scale   SubCutaneous three times a day before meals  insulin lispro (ADMELOG) corrective regimen sliding scale   SubCutaneous at bedtime  latanoprost 0.005% Ophthalmic Solution 1 Drop(s) Both EYES at bedtime  melatonin 6 milliGRAM(s) Oral at bedtime  metoprolol succinate ER 50 milliGRAM(s) Oral daily  timolol 0.5% Solution 1 Drop(s) Both EYES two times a day    MEDICATIONS  (PRN):  dextrose Oral Gel 15 Gram(s) Oral once PRN Blood Glucose LESS THAN 70 milliGRAM(s)/deciliter  OLANZapine Injectable 1.25 milliGRAM(s) IntraMuscular every 6 hours PRN agitation  oxymetazoline 0.05% Nasal Spray 2 Spray(s) Both Nostrils two times a day PRN Congestion    Vital Signs Last 24 Hrs  T(C): 36.8 (19 Jan 2024 05:10), Max: 36.8 (18 Jan 2024 20:30)  T(F): 98.3 (19 Jan 2024 05:10), Max: 98.3 (19 Jan 2024 03:17)  HR: 73 (19 Jan 2024 09:22) (70 - 81)  BP: 127/76 (19 Jan 2024 09:22) (111/61 - 127/76)  BP(mean): 89 (19 Jan 2024 09:22) (89 - 89)  RR: 16 (19 Jan 2024 09:22) (16 - 21)  SpO2: 97% (19 Jan 2024 09:22) (97% - 100%)    Parameters below as of 19 Jan 2024 09:22  Patient On (Oxygen Delivery Method): room air    INTERPRETATION OF TELEMETRY: Sinus rhythm with V pacing @ HR 70s-80s;     LABS:                        15.9   8.47  )-----------( 239      ( 19 Jan 2024 06:07 )             45.0     01-19    136  |  100  |  11  ----------------------------<  94  3.7   |  20<L>  |  0.94    Ca    9.4      19 Jan 2024 06:07  Phos  3.2     01-19  Mg     2.00     01-19          PTT - ( 19 Jan 2024 06:07 )  PTT:31.4 sec  Urinalysis Basic - ( 19 Jan 2024 06:07 )    Color: x / Appearance: x / SG: x / pH: x  Gluc: 94 mg/dL / Ketone: x  / Bili: x / Urobili: x   Blood: x / Protein: x / Nitrite: x   Leuk Esterase: x / RBC: x / WBC x   Sq Epi: x / Non Sq Epi: x / Bacteria: x      I&O's Summary    18 Jan 2024 07:01  -  19 Jan 2024 07:00  --------------------------------------------------------  IN: 0 mL / OUT: 450 mL / NET: -450 mL        PHYSICAL EXAM:    GENERAL: In no apparent distress, well nourished, and hydrated  HEART: Regular rate and rhythm; No murmurs, rubs, or gallops.  PULMONARY: Clear to auscultation and percussion.  No rales, wheezing, or rhonchi bilaterally.  ABDOMEN: Soft, Nontender, Nondistended; Bowel sounds present  EXTREMITIES:  2+ Peripheral Pulses, No clubbing, cyanosis, or edema

## 2024-01-19 NOTE — PROGRESS NOTE ADULT - PROBLEM SELECTOR PLAN 6
SBP dropped to 90s s/p sedation. Likely sedation effect and also noted to have multiple vomiting episodes prior to arrival per niece. Dry lips on exam.. Patient repositioned in bed and repeat 108/63. 500cc bolus as well.  - hold losartan, HCTZ  - c/w metoprolol with hold parameters

## 2024-01-19 NOTE — PROGRESS NOTE ADULT - ASSESSMENT
83M HTN, DM2, CAD/CABG/stent s/p PPM, CVA w/ gait disturbance; on RW, vascular dementia, Prostate CA s/p rad seeds, Colon CA s/p resection, glaucoma p/w NSTEMI s/p C 1/18 with LOVE x2 c/b acute HFrEF c/b metabolic encephalopathy in a setting of baseline dementia.

## 2024-01-19 NOTE — PROGRESS NOTE ADULT - ASSESSMENT
This is a 83 year old man with PMHx early dementia? hx cardiomyopathy, s/p MDT ICD, glaucoma, CVA 1993? (ambulates with walker), HTN, DM2, CABG x4 1995, cardiac stent 1998, prostate cancer tx with radioactive seeds 2004, colon cancer tx with colon resection 2015 presenting with epigastric chest pain and admitted for NSTEMI.    Patient is s/p LHC via RFA yesterday:  dLAD 80%, mRCA 100%, SVG to OM mid 90% x1 LOVE + distal 70% x1 LOVE.     had an episodes of NSVT noted overnight up to 27 beats    Plan  - continue ASA 81 mg, Plavix 75 mg daily for DAPT for 1 year  - continue Toprol 50 mg and increase dose as BP tolerates  - continue Atorvastatin 40 mg  - Monitor electrolytes and replete K to 4 and Mg to 2   - EP is planning to  downgrade BiV ICD to BiV PPM on Monday 1/22/2024

## 2024-01-20 LAB
ANION GAP SERPL CALC-SCNC: 16 MMOL/L — HIGH (ref 7–14)
BUN SERPL-MCNC: 22 MG/DL — SIGNIFICANT CHANGE UP (ref 7–23)
CALCIUM SERPL-MCNC: 9.3 MG/DL — SIGNIFICANT CHANGE UP (ref 8.4–10.5)
CHLORIDE SERPL-SCNC: 103 MMOL/L — SIGNIFICANT CHANGE UP (ref 98–107)
CO2 SERPL-SCNC: 20 MMOL/L — LOW (ref 22–31)
CREAT SERPL-MCNC: 1.11 MG/DL — SIGNIFICANT CHANGE UP (ref 0.5–1.3)
EGFR: 66 ML/MIN/1.73M2 — SIGNIFICANT CHANGE UP
GLUCOSE SERPL-MCNC: 112 MG/DL — HIGH (ref 70–99)
HCT VFR BLD CALC: 44.3 % — SIGNIFICANT CHANGE UP (ref 39–50)
HGB BLD-MCNC: 15.4 G/DL — SIGNIFICANT CHANGE UP (ref 13–17)
MAGNESIUM SERPL-MCNC: 1.9 MG/DL — SIGNIFICANT CHANGE UP (ref 1.6–2.6)
MCHC RBC-ENTMCNC: 32.2 PG — SIGNIFICANT CHANGE UP (ref 27–34)
MCHC RBC-ENTMCNC: 34.8 GM/DL — SIGNIFICANT CHANGE UP (ref 32–36)
MCV RBC AUTO: 92.5 FL — SIGNIFICANT CHANGE UP (ref 80–100)
NRBC # BLD: 0 /100 WBCS — SIGNIFICANT CHANGE UP (ref 0–0)
NRBC # FLD: 0 K/UL — SIGNIFICANT CHANGE UP (ref 0–0)
PHOSPHATE SERPL-MCNC: 3.4 MG/DL — SIGNIFICANT CHANGE UP (ref 2.5–4.5)
PLATELET # BLD AUTO: 212 K/UL — SIGNIFICANT CHANGE UP (ref 150–400)
POTASSIUM SERPL-MCNC: 3.6 MMOL/L — SIGNIFICANT CHANGE UP (ref 3.5–5.3)
POTASSIUM SERPL-SCNC: 3.6 MMOL/L — SIGNIFICANT CHANGE UP (ref 3.5–5.3)
RBC # BLD: 4.79 M/UL — SIGNIFICANT CHANGE UP (ref 4.2–5.8)
RBC # FLD: 13 % — SIGNIFICANT CHANGE UP (ref 10.3–14.5)
SODIUM SERPL-SCNC: 139 MMOL/L — SIGNIFICANT CHANGE UP (ref 135–145)
WBC # BLD: 8.07 K/UL — SIGNIFICANT CHANGE UP (ref 3.8–10.5)
WBC # FLD AUTO: 8.07 K/UL — SIGNIFICANT CHANGE UP (ref 3.8–10.5)

## 2024-01-20 PROCEDURE — 99232 SBSQ HOSP IP/OBS MODERATE 35: CPT

## 2024-01-20 RX ORDER — METOPROLOL TARTRATE 50 MG
50 TABLET ORAL DAILY
Refills: 0 | Status: DISCONTINUED | OUTPATIENT
Start: 2024-01-20 | End: 2024-01-21

## 2024-01-20 RX ORDER — SACUBITRIL AND VALSARTAN 24; 26 MG/1; MG/1
1 TABLET, FILM COATED ORAL
Refills: 0 | Status: DISCONTINUED | OUTPATIENT
Start: 2024-01-20 | End: 2024-01-22

## 2024-01-20 RX ORDER — SACUBITRIL AND VALSARTAN 24; 26 MG/1; MG/1
1 TABLET, FILM COATED ORAL
Qty: 60 | Refills: 0
Start: 2024-01-20 | End: 2024-02-18

## 2024-01-20 RX ORDER — DAPAGLIFLOZIN 10 MG/1
1 TABLET, FILM COATED ORAL
Qty: 30 | Refills: 0
Start: 2024-01-20 | End: 2024-02-18

## 2024-01-20 RX ORDER — DAPAGLIFLOZIN 10 MG/1
10 TABLET, FILM COATED ORAL EVERY 24 HOURS
Refills: 0 | Status: DISCONTINUED | OUTPATIENT
Start: 2024-01-20 | End: 2024-01-22

## 2024-01-20 RX ORDER — POTASSIUM CHLORIDE 20 MEQ
40 PACKET (EA) ORAL ONCE
Refills: 0 | Status: COMPLETED | OUTPATIENT
Start: 2024-01-20 | End: 2024-01-20

## 2024-01-20 RX ADMIN — Medication 50 MILLIGRAM(S): at 17:06

## 2024-01-20 RX ADMIN — ATORVASTATIN CALCIUM 40 MILLIGRAM(S): 80 TABLET, FILM COATED ORAL at 21:00

## 2024-01-20 RX ADMIN — Medication 1 DROP(S): at 17:06

## 2024-01-20 RX ADMIN — DAPAGLIFLOZIN 10 MILLIGRAM(S): 10 TABLET, FILM COATED ORAL at 17:05

## 2024-01-20 RX ADMIN — Medication 40 MILLIEQUIVALENT(S): at 05:23

## 2024-01-20 RX ADMIN — Medication 6 MILLIGRAM(S): at 21:00

## 2024-01-20 RX ADMIN — ENOXAPARIN SODIUM 40 MILLIGRAM(S): 100 INJECTION SUBCUTANEOUS at 12:01

## 2024-01-20 RX ADMIN — CLOPIDOGREL BISULFATE 75 MILLIGRAM(S): 75 TABLET, FILM COATED ORAL at 12:02

## 2024-01-20 RX ADMIN — SACUBITRIL AND VALSARTAN 1 TABLET(S): 24; 26 TABLET, FILM COATED ORAL at 17:08

## 2024-01-20 RX ADMIN — QUETIAPINE FUMARATE 12.5 MILLIGRAM(S): 200 TABLET, FILM COATED ORAL at 21:00

## 2024-01-20 RX ADMIN — Medication 1 DROP(S): at 05:04

## 2024-01-20 RX ADMIN — Medication 81 MILLIGRAM(S): at 12:02

## 2024-01-20 RX ADMIN — LATANOPROST 1 DROP(S): 0.05 SOLUTION/ DROPS OPHTHALMIC; TOPICAL at 21:00

## 2024-01-20 NOTE — PROGRESS NOTE ADULT - SUBJECTIVE AND OBJECTIVE BOX
Patient is a 83y old  Male who presents with a chief complaint of left sided chest pain and epigastric pain x1 day (19 Jan 2024 11:50)      INTERVAL HPI/OVERNIGHT EVENTS: KELLY overnight. No complaints this morning.      REVIEW OF SYSTEMS:    CONSTITUTIONAL: No weakness, fevers or chills  EYES/ENT: No visual changes;  No vertigo or throat pain   NECK: No pain or stiffness  RESPIRATORY: No cough, wheezing, hemoptysis; No shortness of breath  CARDIOVASCULAR: No chest pain or palpitations  GASTROINTESTINAL: No abdominal or epigastric pain. No nausea, vomiting, or hematemesis; No diarrhea or constipation. No melena or hematochezia.  GENITOURINARY: No dysuria, frequency or hematuria  NEUROLOGICAL: No numbness or weakness  All other review of systems is negative unless indicated above.    FAMILY HISTORY:  No pertinent family history in first degree relatives      T(C): 36.9 (01-20-24 @ 04:48), Max: 36.9 (01-19-24 @ 17:37)  HR: 62 (01-20-24 @ 04:48) (62 - 106)  BP: 99/58 (01-20-24 @ 04:48) (99/58 - 123/81)  RR: 18 (01-20-24 @ 04:48) (18 - 18)  SpO2: 97% (01-20-24 @ 04:48) (97% - 100%)  Wt(kg): --Vital Signs Last 24 Hrs  T(C): 36.9 (20 Jan 2024 04:48), Max: 36.9 (19 Jan 2024 17:37)  T(F): 98.5 (20 Jan 2024 04:48), Max: 98.5 (19 Jan 2024 17:37)  HR: 62 (20 Jan 2024 04:48) (62 - 106)  BP: 99/58 (20 Jan 2024 04:48) (99/58 - 123/81)  BP(mean): --  RR: 18 (20 Jan 2024 04:48) (18 - 18)  SpO2: 97% (20 Jan 2024 04:48) (97% - 100%)    Parameters below as of 20 Jan 2024 04:48  Patient On (Oxygen Delivery Method): room air        PHYSICAL EXAM:  GENERAL: NAD, well-groomed, well-developed  HEAD:  Atraumatic, Normocephalic  EYES: EOMI, PERRLA, conjunctiva and sclera clear  ENMT: No tonsillar erythema, exudates, or enlargement; Moist mucous membranes, Good dentition, No lesions  NECK: Supple, No JVD, Normal thyroid  NERVOUS SYSTEM:  Alert & Oriented X3, Good concentration  CHEST/LUNG: Clear to percussion bilaterally; No rales, rhonchi, wheezing, or rubs. PPM palpable over L chest wall  HEART: Regular rate and rhythm; No murmurs, rubs, or gallops  ABDOMEN: Soft, Nontender, Nondistended; Bowel sounds present  EXTREMITIES:  2+ Peripheral Pulses, No clubbing, cyanosis, or edema  LYMPH: No lymphadenopathy noted  SKIN: No rashes or lesions    Consultant(s) Notes Reviewed:  [x ] YES  [ ] NO  Care Discussed with Consultants/Other Providers [ x] YES  [ ] NO    LABS:                        15.4   8.07  )-----------( 212      ( 20 Jan 2024 05:02 )             44.3     20 Jan 2024 05:02    139    |  103    |  22     ----------------------------<  112    3.6     |  20     |  1.11     Ca    9.3        20 Jan 2024 05:02  Phos  3.4       20 Jan 2024 05:02  Mg     1.90      20 Jan 2024 05:02      PTT - ( 19 Jan 2024 06:07 )  PTT:31.4 sec  CAPILLARY BLOOD GLUCOSE      POCT Blood Glucose.: 99 mg/dL (20 Jan 2024 09:02)  POCT Blood Glucose.: 138 mg/dL (19 Jan 2024 21:20)  POCT Blood Glucose.: 134 mg/dL (19 Jan 2024 18:14)  POCT Blood Glucose.: 132 mg/dL (19 Jan 2024 12:56)    BLOOD CULTURE      RADIOLOGY & ADDITIONAL TESTS:    Imaging Personally Reviewed:  [ ] YES  [ ] NO  aspirin enteric coated 81 milliGRAM(s) Oral daily  atorvastatin 40 milliGRAM(s) Oral at bedtime  clopidogrel Tablet 75 milliGRAM(s) Oral daily  dextrose 50% Injectable 25 Gram(s) IV Push once  dextrose 50% Injectable 25 Gram(s) IV Push once  dextrose 50% Injectable 12.5 Gram(s) IV Push once  dextrose Oral Gel 15 Gram(s) Oral once PRN  enoxaparin Injectable 40 milliGRAM(s) SubCutaneous every 24 hours  glucagon  Injectable 1 milliGRAM(s) IntraMuscular once  influenza  Vaccine (HIGH DOSE) 0.7 milliLiter(s) IntraMuscular once  insulin lispro (ADMELOG) corrective regimen sliding scale   SubCutaneous at bedtime  insulin lispro (ADMELOG) corrective regimen sliding scale   SubCutaneous three times a day before meals  latanoprost 0.005% Ophthalmic Solution 1 Drop(s) Both EYES at bedtime  losartan 25 milliGRAM(s) Oral daily  melatonin 6 milliGRAM(s) Oral at bedtime  metoprolol succinate ER 50 milliGRAM(s) Oral daily  OLANZapine Injectable 1.25 milliGRAM(s) IntraMuscular every 6 hours PRN  oxymetazoline 0.05% Nasal Spray 2 Spray(s) Both Nostrils two times a day PRN  QUEtiapine 12.5 milliGRAM(s) Oral at bedtime  timolol 0.5% Solution 1 Drop(s) Both EYES two times a day      HEALTH ISSUES - PROBLEM Dx:  NSTEMI (non-ST elevation myocardial infarction)    Acute encephalopathy    Type 2 diabetes mellitus with hyperglycemia, without long-term current use of insulin    HTN (hypertension)    History of CVA (cerebrovascular accident)    ICD (implantable cardioverter-defibrillator) in place    Acute systolic heart failure

## 2024-01-20 NOTE — PROGRESS NOTE ADULT - PROBLEM SELECTOR PLAN 2
- TTE 1/15: left ventricular systolic function is severely decreased with a calculated ejection fraction of 23%  - Metoprolol succinate 50 daily  - Losartan 25 daily - TTE 1/15: left ventricular systolic function is severely decreased with a calculated ejection fraction of 23%  - Metoprolol succinate 50 daily  - Entresto 24-26 bid  - Farxiga 10 daily

## 2024-01-20 NOTE — PROGRESS NOTE ADULT - ASSESSMENT
83M HTN, DM2, CAD/CABG/stent s/p PPM, CVA w/ gait disturbance; on RW, vascular dementia, Prostate CA s/p rad seeds, Colon CA s/p resection, glaucoma p/w NSTEMI s/p C 1/18 with LOVE x2 c/b acute HFrEF c/b metabolic encephalopathy in a setting of baseline dementia. Pending PPM downgrade on Monday.

## 2024-01-21 ENCOUNTER — NON-APPOINTMENT (OUTPATIENT)
Age: 84
End: 2024-01-21

## 2024-01-21 DIAGNOSIS — Z29.9 ENCOUNTER FOR PROPHYLACTIC MEASURES, UNSPECIFIED: ICD-10-CM

## 2024-01-21 LAB
ANION GAP SERPL CALC-SCNC: 13 MMOL/L — SIGNIFICANT CHANGE UP (ref 7–14)
ANION GAP SERPL CALC-SCNC: 14 MMOL/L — SIGNIFICANT CHANGE UP (ref 7–14)
BUN SERPL-MCNC: 22 MG/DL — SIGNIFICANT CHANGE UP (ref 7–23)
BUN SERPL-MCNC: 25 MG/DL — HIGH (ref 7–23)
CALCIUM SERPL-MCNC: 9.3 MG/DL — SIGNIFICANT CHANGE UP (ref 8.4–10.5)
CALCIUM SERPL-MCNC: 9.3 MG/DL — SIGNIFICANT CHANGE UP (ref 8.4–10.5)
CHLORIDE SERPL-SCNC: 101 MMOL/L — SIGNIFICANT CHANGE UP (ref 98–107)
CHLORIDE SERPL-SCNC: 103 MMOL/L — SIGNIFICANT CHANGE UP (ref 98–107)
CO2 SERPL-SCNC: 20 MMOL/L — LOW (ref 22–31)
CO2 SERPL-SCNC: 21 MMOL/L — LOW (ref 22–31)
CREAT SERPL-MCNC: 1 MG/DL — SIGNIFICANT CHANGE UP (ref 0.5–1.3)
CREAT SERPL-MCNC: 1.01 MG/DL — SIGNIFICANT CHANGE UP (ref 0.5–1.3)
EGFR: 74 ML/MIN/1.73M2 — SIGNIFICANT CHANGE UP
EGFR: 75 ML/MIN/1.73M2 — SIGNIFICANT CHANGE UP
GLUCOSE SERPL-MCNC: 129 MG/DL — HIGH (ref 70–99)
GLUCOSE SERPL-MCNC: 158 MG/DL — HIGH (ref 70–99)
HCT VFR BLD CALC: 44.7 % — SIGNIFICANT CHANGE UP (ref 39–50)
HGB BLD-MCNC: 15.6 G/DL — SIGNIFICANT CHANGE UP (ref 13–17)
MAGNESIUM SERPL-MCNC: 1.9 MG/DL — SIGNIFICANT CHANGE UP (ref 1.6–2.6)
MAGNESIUM SERPL-MCNC: 2 MG/DL — SIGNIFICANT CHANGE UP (ref 1.6–2.6)
MCHC RBC-ENTMCNC: 31.8 PG — SIGNIFICANT CHANGE UP (ref 27–34)
MCHC RBC-ENTMCNC: 34.9 GM/DL — SIGNIFICANT CHANGE UP (ref 32–36)
MCV RBC AUTO: 91.2 FL — SIGNIFICANT CHANGE UP (ref 80–100)
NRBC # BLD: 0 /100 WBCS — SIGNIFICANT CHANGE UP (ref 0–0)
NRBC # FLD: 0 K/UL — SIGNIFICANT CHANGE UP (ref 0–0)
PHOSPHATE SERPL-MCNC: 3.3 MG/DL — SIGNIFICANT CHANGE UP (ref 2.5–4.5)
PHOSPHATE SERPL-MCNC: 4 MG/DL — SIGNIFICANT CHANGE UP (ref 2.5–4.5)
PLATELET # BLD AUTO: 197 K/UL — SIGNIFICANT CHANGE UP (ref 150–400)
POTASSIUM SERPL-MCNC: 3.7 MMOL/L — SIGNIFICANT CHANGE UP (ref 3.5–5.3)
POTASSIUM SERPL-MCNC: 4.2 MMOL/L — SIGNIFICANT CHANGE UP (ref 3.5–5.3)
POTASSIUM SERPL-SCNC: 3.7 MMOL/L — SIGNIFICANT CHANGE UP (ref 3.5–5.3)
POTASSIUM SERPL-SCNC: 4.2 MMOL/L — SIGNIFICANT CHANGE UP (ref 3.5–5.3)
RBC # BLD: 4.9 M/UL — SIGNIFICANT CHANGE UP (ref 4.2–5.8)
RBC # FLD: 13 % — SIGNIFICANT CHANGE UP (ref 10.3–14.5)
SODIUM SERPL-SCNC: 135 MMOL/L — SIGNIFICANT CHANGE UP (ref 135–145)
SODIUM SERPL-SCNC: 137 MMOL/L — SIGNIFICANT CHANGE UP (ref 135–145)
WBC # BLD: 10.68 K/UL — HIGH (ref 3.8–10.5)
WBC # FLD AUTO: 10.68 K/UL — HIGH (ref 3.8–10.5)

## 2024-01-21 PROCEDURE — 93010 ELECTROCARDIOGRAM REPORT: CPT | Mod: 76

## 2024-01-21 PROCEDURE — 99232 SBSQ HOSP IP/OBS MODERATE 35: CPT

## 2024-01-21 RX ORDER — MAGNESIUM SULFATE 500 MG/ML
1 VIAL (ML) INJECTION ONCE
Refills: 0 | Status: COMPLETED | OUTPATIENT
Start: 2024-01-21 | End: 2024-01-21

## 2024-01-21 RX ORDER — METOPROLOL TARTRATE 50 MG
25 TABLET ORAL ONCE
Refills: 0 | Status: COMPLETED | OUTPATIENT
Start: 2024-01-21 | End: 2024-01-21

## 2024-01-21 RX ORDER — METOPROLOL TARTRATE 50 MG
50 TABLET ORAL
Refills: 0 | Status: DISCONTINUED | OUTPATIENT
Start: 2024-01-22 | End: 2024-01-22

## 2024-01-21 RX ORDER — POTASSIUM PHOSPHATE, MONOBASIC POTASSIUM PHOSPHATE, DIBASIC 236; 224 MG/ML; MG/ML
15 INJECTION, SOLUTION INTRAVENOUS ONCE
Refills: 0 | Status: COMPLETED | OUTPATIENT
Start: 2024-01-21 | End: 2024-01-21

## 2024-01-21 RX ORDER — POTASSIUM CHLORIDE 20 MEQ
40 PACKET (EA) ORAL ONCE
Refills: 0 | Status: COMPLETED | OUTPATIENT
Start: 2024-01-21 | End: 2024-01-21

## 2024-01-21 RX ADMIN — Medication 81 MILLIGRAM(S): at 13:06

## 2024-01-21 RX ADMIN — Medication 6 MILLIGRAM(S): at 22:09

## 2024-01-21 RX ADMIN — Medication 0: at 18:57

## 2024-01-21 RX ADMIN — QUETIAPINE FUMARATE 12.5 MILLIGRAM(S): 200 TABLET, FILM COATED ORAL at 22:09

## 2024-01-21 RX ADMIN — Medication 25 MILLIGRAM(S): at 08:52

## 2024-01-21 RX ADMIN — CLOPIDOGREL BISULFATE 75 MILLIGRAM(S): 75 TABLET, FILM COATED ORAL at 13:07

## 2024-01-21 RX ADMIN — Medication 40 MILLIEQUIVALENT(S): at 04:24

## 2024-01-21 RX ADMIN — Medication 1 DROP(S): at 17:03

## 2024-01-21 RX ADMIN — Medication 50 MILLIGRAM(S): at 04:26

## 2024-01-21 RX ADMIN — ATORVASTATIN CALCIUM 40 MILLIGRAM(S): 80 TABLET, FILM COATED ORAL at 22:09

## 2024-01-21 RX ADMIN — SACUBITRIL AND VALSARTAN 1 TABLET(S): 24; 26 TABLET, FILM COATED ORAL at 04:26

## 2024-01-21 RX ADMIN — Medication 100 GRAM(S): at 22:53

## 2024-01-21 RX ADMIN — Medication 1 DROP(S): at 04:25

## 2024-01-21 RX ADMIN — POTASSIUM PHOSPHATE, MONOBASIC POTASSIUM PHOSPHATE, DIBASIC 62.5 MILLIMOLE(S): 236; 224 INJECTION, SOLUTION INTRAVENOUS at 13:06

## 2024-01-21 RX ADMIN — LATANOPROST 1 DROP(S): 0.05 SOLUTION/ DROPS OPHTHALMIC; TOPICAL at 22:08

## 2024-01-21 RX ADMIN — Medication 1: at 12:48

## 2024-01-21 RX ADMIN — SACUBITRIL AND VALSARTAN 1 TABLET(S): 24; 26 TABLET, FILM COATED ORAL at 17:03

## 2024-01-21 RX ADMIN — ENOXAPARIN SODIUM 40 MILLIGRAM(S): 100 INJECTION SUBCUTANEOUS at 13:06

## 2024-01-21 RX ADMIN — Medication 25 MILLIGRAM(S): at 17:20

## 2024-01-21 RX ADMIN — DAPAGLIFLOZIN 10 MILLIGRAM(S): 10 TABLET, FILM COATED ORAL at 17:03

## 2024-01-21 NOTE — PROVIDER CONTACT NOTE (OTHER) - RECOMMENDATIONS
12 lead EKG, 25g metropolol given
Notify ACP
12 lead EKG, give metropolol early, draw AM labs early, potassium supplements given

## 2024-01-21 NOTE — CHART NOTE - NSCHARTNOTEFT_GEN_A_CORE
CC: NSVT  HPI: Notified by RN that patient showed 9 beats of NSVT on the monitor. Patient currently asymptomatic. Denies chest pain, palpitations, SOB, dizziness, any other complaints.    Vital Signs:  Vital Signs Last 24 Hrs  T(C): 37.3 (20 Jan 2024 20:55), Max: 37.3 (20 Jan 2024 20:55)  T(F): 99.1 (20 Jan 2024 20:55), Max: 99.1 (20 Jan 2024 20:55)  HR: 80 (20 Jan 2024 20:55) (78 - 80)  BP: 117/69 (20 Jan 2024 20:55) (108/67 - 117/69)  BP(mean): --  RR: 18 (20 Jan 2024 20:55) (18 - 18)  SpO2: 99% (20 Jan 2024 20:55) (97% - 99%)    Parameters below as of 20 Jan 2024 20:55  Patient On (Oxygen Delivery Method): room air      PHYSICAL EXAM:  General: A&Ox3. Laying in bed, NAD.  Lungs: CTA b/l.  CV: +S1/S2, no murmur appreciated.  Extremities: Peripheral pulses intact b/l. No cyanosis or edema.       Admitted for NSTEMI s/p cath (2 stents placed) with 9 beats of NSVT. Patient asymptomatic. Upon review of tele monitor frequent PVCs noted. Rhythm paced. EP consulted overnight. Appreciate recs..    Plan:  - EKG  - Replete lytes to maintain K>4 and Mg>2  - BB dose given early   - Continue current GDMT   - Plan for BiV ICD downgrade to BiV PPM on Monday 1/22/2024  - Will continue to monitor and observe closely on Tele  - Will endorse to AM team to f/u      Maren Siddiqui NP  Medicine Coverage Night   [phone]

## 2024-01-21 NOTE — CHART NOTE - NSCHARTNOTEFT_GEN_A_CORE
83M with PMHx early dementia? hx cardiomyopathy, s/p MDT ICD, glaucoma, CVA 1993? (ambulates with walker), HTN, DM2, CABG x4 1995, cardiac stent 1998, prostate cancer tx with radioactive seeds 2004, colon cancer tx with colon resection 2015 presenting with epigastric chest pain and admitted for NSTEMI. Previous device interrogation revealed the device was at ROLY and had no events. Of note patient recently saw EP Dr. Cruz with interrogation revealing ROLY with plan for downgrade to BiV PPM and generator change. Patient is s/p C 1/18 dLAD 80%, mRCA 100%, SVG to OM mid 90% x1 LOVE + distal 70% x1 LOVE. EP called overnight for c/f numerous episodes of NSVT, patient previously noted to have episodes of AIVR 1/19. EKG significant for AIVR and telemetry again with episodes of AIVR overnight. Patient asymptomatic, VSS. Should patient HD status change, please call cardiology once again at #[phone].    Plan:  - Continuous telemetry monitoring  - Replete lytes to maintain K>4 and Mg>2  - Continue BB and increase dose as BP tolerates  - Continue current GDMT   - Plan for BiV ICD downgrade to BiV PPM on Monday 1/22/2024  - Continue care per primary team 83M with PMHx early dementia? hx cardiomyopathy, s/p MDT ICD, glaucoma, CVA 1993? (ambulates with walker), HTN, DM2, CABG x4 1995, cardiac stent 1998, prostate cancer tx with radioactive seeds 2004, colon cancer tx with colon resection 2015 presenting with epigastric chest pain and admitted for NSTEMI. Previous device interrogation revealed the device was at ROLY and had no events. Of note patient recently saw EP Dr. Cruz with interrogation revealing ROLY with plan for downgrade to BiV PPM and generator change. Patient is s/p C 1/18 dLAD 80%, mRCA 100%, SVG to OM mid 90% x1 LOVE + distal 70% x1 LOVE. EP called overnight for c/f numerous episodes of NSVT, patient previously noted to have episodes of AIVR 1/19. EKG showing paced rhythm and telemetry again with episodes of AIVR overnight. Patient asymptomatic, VSS. Should patient HD status change, please call cardiology once again at #[phone].    Plan:  - Continuous telemetry monitoring  - Replete lytes to maintain K>4 and Mg>2  - Continue BB and increase dose as BP tolerates  - Continue current GDMT   - Plan for BiV ICD downgrade to BiV PPM on Monday 1/22/2024  - Continue care per primary team

## 2024-01-21 NOTE — PROVIDER CONTACT NOTE (OTHER) - BACKGROUND
Glaucoma, Left ventricular Dysfunction, ACID/pacer, Acute Myocardial infarction
CVA, Glaucoma, left ventricular dysfunction, ACID/Pacer, Acute Myocardial Infarction
Patient admitted for chest pain and rising troponin concerning for NSTEMI. s/p 2 stents.

## 2024-01-21 NOTE — PROGRESS NOTE ADULT - PROBLEM SELECTOR PLAN 2
- Currently euvolemic  - TTE 1/15: left ventricular systolic function is severely decreased with a calculated ejection fraction of 23%  - Metoprolol succinate 50 bid  - Entresto 24-26 bid  - Farxiga 10 daily

## 2024-01-21 NOTE — PROGRESS NOTE ADULT - SUBJECTIVE AND OBJECTIVE BOX
Cardiac Electrophysiology Progress Note    Patient seen and examined at bedside.    Overnight Events:   Tele: AV-paced, PVCs, couplets, NSVT to around 120 bpm longest overnight lasting 38 beats)    Review Of Systems: No chest pain, shortness of breath, or palpitations            Current Meds:  aspirin enteric coated 81 milliGRAM(s) Oral daily  atorvastatin 40 milliGRAM(s) Oral at bedtime  clopidogrel Tablet 75 milliGRAM(s) Oral daily  dapagliflozin 10 milliGRAM(s) Oral every 24 hours  dextrose 50% Injectable 12.5 Gram(s) IV Push once  dextrose 50% Injectable 25 Gram(s) IV Push once  dextrose 50% Injectable 25 Gram(s) IV Push once  dextrose Oral Gel 15 Gram(s) Oral once PRN  enoxaparin Injectable 40 milliGRAM(s) SubCutaneous every 24 hours  glucagon  Injectable 1 milliGRAM(s) IntraMuscular once  influenza  Vaccine (HIGH DOSE) 0.7 milliLiter(s) IntraMuscular once  insulin lispro (ADMELOG) corrective regimen sliding scale   SubCutaneous at bedtime  insulin lispro (ADMELOG) corrective regimen sliding scale   SubCutaneous three times a day before meals  latanoprost 0.005% Ophthalmic Solution 1 Drop(s) Both EYES at bedtime  melatonin 6 milliGRAM(s) Oral at bedtime  metoprolol succinate ER 50 milliGRAM(s) Oral daily  OLANZapine Injectable 1.25 milliGRAM(s) IntraMuscular every 6 hours PRN  oxymetazoline 0.05% Nasal Spray 2 Spray(s) Both Nostrils two times a day PRN  potassium phosphate IVPB 15 milliMole(s) IV Intermittent once  QUEtiapine 12.5 milliGRAM(s) Oral at bedtime  sacubitril 24 mG/valsartan 26 mG 1 Tablet(s) Oral two times a day  timolol 0.5% Solution 1 Drop(s) Both EYES two times a day      Vitals:  T(F): 97.8 (01-21), Max: 99.1 (01-20)  HR: 77 (01-21) (77 - 80)  BP: 115/65 (01-21) (107/67 - 117/69)  RR: 18 (01-21)  SpO2: 100% (01-21)  I&O's Summary      Physical Exam:  GENERAL: In no apparent distress, well nourished, and hydrated  HEART: Regular rate and rhythm; No murmurs, rubs, or gallops.  PULMONARY: Clear to auscultation and percussion.  No rales, wheezing, or rhonchi bilaterally.  ABDOMEN: Soft, Nontender, Nondistended; Bowel sounds present  EXTREMITIES:  2+ Peripheral Pulses, No clubbing, cyanosis, or edema                          15.6   10.68 )-----------( 197      ( 21 Jan 2024 02:03 )             44.7     01-21    137  |  103  |  25<H>  ----------------------------<  129<H>  3.7   |  20<L>  |  1.01    Ca    9.3      21 Jan 2024 02:03  Phos  3.3     01-21  Mg     2.00     01-21    CARDIAC MARKERS ( 17 Jan 2024 06:58 )  1209 ng/L / x     / x     / 803 U/L / x     / 148.6 ng/mL  CARDIAC MARKERS ( 16 Jan 2024 21:56 )  343 ng/L / x     / x     / 584 U/L / x     / 105.7 ng/mL  CARDIAC MARKERS ( 16 Jan 2024 14:44 )  158 ng/L / x     / x     / x     / x     / x      CARDIAC MARKERS ( 16 Jan 2024 12:49 )  99 ng/L / x     / x     / 190 U/L / x     / 24.8 ng/mL

## 2024-01-21 NOTE — PROVIDER CONTACT NOTE (OTHER) - ASSESSMENT
Patient asymptomatic, VSS.
no distress, vitals stable, pt. safety maintained
no distress, vitals stable, pt. safety maintained

## 2024-01-21 NOTE — CHART NOTE - NSCHARTNOTEFT_GEN_A_CORE
Notified by RN patient with 38 beats of NSVT. Patient assessed at bedside without complaint of chest pain, palpitations, lightheadedness, dizziness, n/v/d, abdominal pain. 12 lead EKG ordered. Electrolytes supplemented overnight.     ICU Vital Signs Last 24 Hrs  T(C): 36.4 (21 Jan 2024 15:21), Max: 37.3 (20 Jan 2024 20:55)  T(F): 97.5 (21 Jan 2024 15:21), Max: 99.1 (20 Jan 2024 20:55)  HR: 77 (21 Jan 2024 15:21) (77 - 80)  BP: 115/80 (21 Jan 2024 15:21) (107/67 - 117/69)  BP(mean): 90 (21 Jan 2024 15:21) (90 - 90)  ABP: --  ABP(mean): --  RR: 17 (21 Jan 2024 15:21) (17 - 18)  SpO2: 100% (21 Jan 2024 15:21) (99% - 100%)    O2 Parameters below as of 21 Jan 2024 15:21  Patient On (Oxygen Delivery Method): room air    01-21    137  |  103  |  25<H>  ----------------------------<  129<H>  3.7   |  20<L>  |  1.01    Ca    9.3      21 Jan 2024 02:03  Phos  3.3     01-21  Mg     2.00     01-21    Patient with another episode of 30 beats NSVT at ~@15:00. Patient received total Toprol 75mg this morning, will give Toprol 25mg x1 tonight and increase to 50mg BID starting tomorrow. Will order BMP for 1800 to monitor electrolytes. Case discussed with EP, no further recommendations at this time. Advised to call back [phone] if patient becomes symptomatic, hemodynamically unstable or if VT sustains. Case discussed with Dr. Wallace. Will continue to monitor and endorse plan to night team. Notified by RN patient with 38 beats of NSVT. Patient assessed at bedside without complaint of chest pain, palpitations, lightheadedness, dizziness, n/v/d, abdominal pain. 12 lead EKG ordered. Electrolytes supplemented overnight.     ICU Vital Signs Last 24 Hrs  T(C): 36.4 (21 Jan 2024 15:21), Max: 37.3 (20 Jan 2024 20:55)  T(F): 97.5 (21 Jan 2024 15:21), Max: 99.1 (20 Jan 2024 20:55)  HR: 77 (21 Jan 2024 15:21) (77 - 80)  BP: 115/80 (21 Jan 2024 15:21) (107/67 - 117/69)  BP(mean): 90 (21 Jan 2024 15:21) (90 - 90)  ABP: --  ABP(mean): --  RR: 17 (21 Jan 2024 15:21) (17 - 18)  SpO2: 100% (21 Jan 2024 15:21) (99% - 100%)    O2 Parameters below as of 21 Jan 2024 15:21  Patient On (Oxygen Delivery Method): room air    01-21    137  |  103  |  25<H>  ----------------------------<  129<H>  3.7   |  20<L>  |  1.01    Ca    9.3      21 Jan 2024 02:03  Phos  3.3     01-21  Mg     2.00     01-21    Patient with another episode of 30 beats NSVT at ~@15:00. Patient received total Toprol 75mg this morning, will give Toprol 25mg x1 tonight and increase to 50mg BID starting tomorrow. Will order BMP for 1800 to monitor electrolytes. Case discussed with EP, no further recommendations at this time. Advised to call back [phone] if patient becomes symptomatic, hemodynamically unstable or if VT sustains. Of note patient is NPO pMN for possible BiV ICD downgrade to BiV PPM on Monday 1/22/2024. Will continue to monitor and endorse plan to night team. Case discussed with Dr. Wallace. Notified by RN patient with 38 beats of NSVT. Patient assessed at bedside without complaint of chest pain, palpitations, lightheadedness, dizziness, n/v/d, abdominal pain. 12 lead EKG ordered. Electrolytes supplemented overnight.     ICU Vital Signs Last 24 Hrs  T(C): 36.4 (21 Jan 2024 15:21), Max: 37.3 (20 Jan 2024 20:55)  T(F): 97.5 (21 Jan 2024 15:21), Max: 99.1 (20 Jan 2024 20:55)  HR: 77 (21 Jan 2024 15:21) (77 - 80)  BP: 115/80 (21 Jan 2024 15:21) (107/67 - 117/69)  BP(mean): 90 (21 Jan 2024 15:21) (90 - 90)  ABP: --  ABP(mean): --  RR: 17 (21 Jan 2024 15:21) (17 - 18)  SpO2: 100% (21 Jan 2024 15:21) (99% - 100%)    O2 Parameters below as of 21 Jan 2024 15:21  Patient On (Oxygen Delivery Method): room air    01-21    137  |  103  |  25<H>  ----------------------------<  129<H>  3.7   |  20<L>  |  1.01    Ca    9.3      21 Jan 2024 02:03  Phos  3.3     01-21  Mg     2.00     01-21    Patient with another episode of 30 beats NSVT at ~@15:00. Patient received total Toprol 75mg this morning, will give Toprol 25mg x1 STAT and increase to 50mg BID starting tomorrow. Will order BMP for 1800 to monitor electrolytes. Case discussed with EP, no further recommendations at this time. Advised to call back [phone] if patient becomes symptomatic, hemodynamically unstable or if VT sustains. Of note patient is NPO pMN for possible BiV ICD downgrade to BiV PPM on Monday 1/22/2024. Will continue to monitor and endorse plan to night team. Case discussed with Dr. Wallace.

## 2024-01-21 NOTE — PROGRESS NOTE ADULT - SUBJECTIVE AND OBJECTIVE BOX
Patient is a 83y old  Male who presents with a chief complaint of left sided chest pain and epigastric pain x1 day (20 Jan 2024 11:59)      INTERVAL HPI/OVERNIGHT EVENTS: Overnight, had 9 beats of NSVT then later 30+ beats. Pt asymptomatic and spontaneously reverted to paced rhythm.        REVIEW OF SYSTEMS:    CONSTITUTIONAL: No weakness, fevers or chills  EYES/ENT: No visual changes;  No vertigo or throat pain   NECK: No pain or stiffness  RESPIRATORY: No cough, wheezing, hemoptysis; No shortness of breath  CARDIOVASCULAR: No chest pain or palpitations  GASTROINTESTINAL: No abdominal or epigastric pain. No nausea, vomiting, or hematemesis; No diarrhea or constipation. No melena or hematochezia.  GENITOURINARY: No dysuria, frequency or hematuria  NEUROLOGICAL: No numbness or weakness  All other review of systems is negative unless indicated above.    FAMILY HISTORY:  No pertinent family history in first degree relatives      T(C): 37 (01-21-24 @ 04:20), Max: 37.3 (01-20-24 @ 20:55)  HR: 78 (01-21-24 @ 04:20) (78 - 80)  BP: 107/67 (01-21-24 @ 04:20) (107/67 - 117/69)  RR: 18 (01-21-24 @ 04:20) (18 - 18)  SpO2: 100% (01-21-24 @ 04:20) (97% - 100%)  Wt(kg): --Vital Signs Last 24 Hrs  T(C): 37 (21 Jan 2024 04:20), Max: 37.3 (20 Jan 2024 20:55)  T(F): 98.6 (21 Jan 2024 04:20), Max: 99.1 (20 Jan 2024 20:55)  HR: 78 (21 Jan 2024 04:20) (78 - 80)  BP: 107/67 (21 Jan 2024 04:20) (107/67 - 117/69)  BP(mean): --  RR: 18 (21 Jan 2024 04:20) (18 - 18)  SpO2: 100% (21 Jan 2024 04:20) (97% - 100%)    Parameters below as of 21 Jan 2024 04:20  Patient On (Oxygen Delivery Method): room air        PHYSICAL EXAM:  GENERAL: NAD, well-groomed, well-developed  HEAD:  Atraumatic, Normocephalic  EYES: EOMI, PERRLA, conjunctiva and sclera clear  ENMT: No tonsillar erythema, exudates, or enlargement; Moist mucous membranes, Good dentition, No lesions  NECK: Supple, No JVD, Normal thyroid  NERVOUS SYSTEM:  Alert & Oriented X3, Good concentration  CHEST/LUNG: Clear to percussion bilaterally; No rales, rhonchi, wheezing, or rubs. PPM palpable over L chest wall  HEART: Regular rate and rhythm; No murmurs, rubs, or gallops  ABDOMEN: Soft, Nontender, Nondistended; Bowel sounds present  EXTREMITIES:  2+ Peripheral Pulses, No clubbing, cyanosis, or edema  LYMPH: No lymphadenopathy noted  SKIN: No rashes or lesions    Consultant(s) Notes Reviewed:  [x ] YES  [ ] NO  Care Discussed with Consultants/Other Providers [ x] YES  [ ] NO    LABS:                        15.6   10.68 )-----------( 197      ( 21 Jan 2024 02:03 )             44.7     21 Jan 2024 02:03    137    |  103    |  25     ----------------------------<  129    3.7     |  20     |  1.01     Ca    9.3        21 Jan 2024 02:03  Phos  3.3       21 Jan 2024 02:03  Mg     2.00      21 Jan 2024 02:03        CAPILLARY BLOOD GLUCOSE      POCT Blood Glucose.: 109 mg/dL (21 Jan 2024 08:48)  POCT Blood Glucose.: 141 mg/dL (20 Jan 2024 20:57)  POCT Blood Glucose.: 113 mg/dL (20 Jan 2024 18:01)  POCT Blood Glucose.: 115 mg/dL (20 Jan 2024 13:34)    BLOOD CULTURE      RADIOLOGY & ADDITIONAL TESTS:    Imaging Personally Reviewed:  [ ] YES  [ ] NO  aspirin enteric coated 81 milliGRAM(s) Oral daily  atorvastatin 40 milliGRAM(s) Oral at bedtime  clopidogrel Tablet 75 milliGRAM(s) Oral daily  dapagliflozin 10 milliGRAM(s) Oral every 24 hours  dextrose 50% Injectable 12.5 Gram(s) IV Push once  dextrose 50% Injectable 25 Gram(s) IV Push once  dextrose 50% Injectable 25 Gram(s) IV Push once  dextrose Oral Gel 15 Gram(s) Oral once PRN  enoxaparin Injectable 40 milliGRAM(s) SubCutaneous every 24 hours  glucagon  Injectable 1 milliGRAM(s) IntraMuscular once  influenza  Vaccine (HIGH DOSE) 0.7 milliLiter(s) IntraMuscular once  insulin lispro (ADMELOG) corrective regimen sliding scale   SubCutaneous three times a day before meals  insulin lispro (ADMELOG) corrective regimen sliding scale   SubCutaneous at bedtime  latanoprost 0.005% Ophthalmic Solution 1 Drop(s) Both EYES at bedtime  melatonin 6 milliGRAM(s) Oral at bedtime  metoprolol succinate ER 50 milliGRAM(s) Oral daily  OLANZapine Injectable 1.25 milliGRAM(s) IntraMuscular every 6 hours PRN  oxymetazoline 0.05% Nasal Spray 2 Spray(s) Both Nostrils two times a day PRN  potassium phosphate IVPB 15 milliMole(s) IV Intermittent once  QUEtiapine 12.5 milliGRAM(s) Oral at bedtime  sacubitril 24 mG/valsartan 26 mG 1 Tablet(s) Oral two times a day  timolol 0.5% Solution 1 Drop(s) Both EYES two times a day      HEALTH ISSUES - PROBLEM Dx:  NSTEMI (non-ST elevation myocardial infarction)    Acute encephalopathy    Type 2 diabetes mellitus with hyperglycemia, without long-term current use of insulin    HTN (hypertension)    History of CVA (cerebrovascular accident)    ICD (implantable cardioverter-defibrillator) in place    Acute systolic heart failure

## 2024-01-21 NOTE — PROVIDER CONTACT NOTE (OTHER) - ACTION/TREATMENT ORDERED:
12 lead EKG and 25g metropolol given
ACP aware, 12 lead EKG and BMP ordered.
12 lead EKG, give metropolol early, draw AM labs early, potassium supplements given

## 2024-01-21 NOTE — PROGRESS NOTE ADULT - ASSESSMENT
83 year old man with PMHx early dementia? hx cardiomyopathy, s/p MDT ICD, glaucoma, CVA 1993? (ambulates with walker), HTN, DM2, CABG x4 1995, cardiac stent 1998, prostate cancer tx with radioactive seeds 2004, colon cancer tx with colon resection 2015 presenting with epigastric chest pain and admitted for NSTEMI. Device interrogation revealed the device was at ROLY and had no events. Of note patient recently saw EP Dr. Cruz with interrogation revealing ROLY with plan for downgrade to BiV PPM and generator change.  The alternatives, risks, and benefits were explained in detail which included but not limited to bleeding requiring transfusion, infection, stroke, plural effusion, esophageal injury, pericardial effusion, cardiac tamponade requiring chest tube, intubation, and death. Patient expressed understanding and all questions were answered. Patient was AOx3 (person, place and time) and was able to explain why his BiV-ICD is being downgraded and the risk associated with the procedure. Patient is s/p LHC yesterday dLAD 80%, mRCA 100%, SVG to OM mid 90% x1 LOVE + distal 70% x1 LOVE. Episodes of AIVR noted overnight up to 27 beats  1. Chest Pain - interrogation no AICD shocks or arrhthymias suspect ACS   2. ICM s/p MDT BiV AICD  3. CAD s/p CABG and stents    Plan:  - Continuous telemetry monitoring  - Monitor electrolytes and replete K to 4 and Mg to 2  - Continue metoprolol succinate and increase dose as BP tolerates  - Continue GDMT   - Plan for possible BiV ICD downgrade to BiV PPM on Monday 1/22/2024, make NPO at MN  - Continue care per primary team     Moody Harmon MD  Department of Cardiology  Cardiology Fellow, PGY6 83 year old man with PMHx early dementia? hx cardiomyopathy, s/p MDT ICD, glaucoma, CVA 1993? (ambulates with walker), HTN, DM2, CABG x4 1995, cardiac stent 1998, prostate cancer tx with radioactive seeds 2004, colon cancer tx with colon resection 2015 presenting with epigastric chest pain and admitted for NSTEMI. Device interrogation revealed the device was at ROLY and had no events. Of note patient recently saw EP Dr. Cruz with interrogation revealing ROLY with plan for downgrade to BiV PPM and generator change.  The alternatives, risks, and benefits were explained in detail which included but not limited to bleeding requiring transfusion, infection, stroke, plural effusion, esophageal injury, pericardial effusion, cardiac tamponade requiring chest tube, intubation, and death. Patient expressed understanding and all questions were answered. Patient was AOx3 (person, place and time) and was able to explain why his BiV-ICD is being downgraded and the risk associated with the procedure. Patient is s/p LHC yesterday dLAD 80%, mRCA 100%, SVG to OM mid 90% x1 LOVE + distal 70% x1 LOVE.   1. Chest Pain - interrogation no AICD shocks or arrhthymias suspect ACS   2. ICM s/p MDT BiV AICD  3. CAD s/p CABG and stents    Plan:  - Continuous telemetry monitoring  - Monitor electrolytes and replete K to 4 and Mg to 2  - Continue metoprolol succinate and increase dose as BP tolerates  - Continue GDMT   - Plan for possible BiV ICD downgrade to BiV PPM on Monday 1/22/2024, make NPO at MN  - Continue care per primary team     Moody Harmon MD  Department of Cardiology  Cardiology Fellow, PGY6

## 2024-01-22 ENCOUNTER — TRANSCRIPTION ENCOUNTER (OUTPATIENT)
Age: 84
End: 2024-01-22

## 2024-01-22 VITALS
OXYGEN SATURATION: 100 % | RESPIRATION RATE: 18 BRPM | HEART RATE: 90 BPM | TEMPERATURE: 98 F | SYSTOLIC BLOOD PRESSURE: 104 MMHG | DIASTOLIC BLOOD PRESSURE: 73 MMHG

## 2024-01-22 LAB
ANION GAP SERPL CALC-SCNC: 12 MMOL/L — SIGNIFICANT CHANGE UP (ref 7–14)
ANION GAP SERPL CALC-SCNC: 14 MMOL/L — SIGNIFICANT CHANGE UP (ref 7–14)
APTT BLD: 36.9 SEC — HIGH (ref 24.5–35.6)
B-OH-BUTYR SERPL-SCNC: 1.1 MMOL/L — HIGH (ref 0–0.4)
B-OH-BUTYR SERPL-SCNC: 1.1 MMOL/L — HIGH (ref 0–0.4)
BUN SERPL-MCNC: 16 MG/DL — SIGNIFICANT CHANGE UP (ref 7–23)
BUN SERPL-MCNC: 18 MG/DL — SIGNIFICANT CHANGE UP (ref 7–23)
CALCIUM SERPL-MCNC: 9.3 MG/DL — SIGNIFICANT CHANGE UP (ref 8.4–10.5)
CALCIUM SERPL-MCNC: 9.5 MG/DL — SIGNIFICANT CHANGE UP (ref 8.4–10.5)
CHLORIDE SERPL-SCNC: 103 MMOL/L — SIGNIFICANT CHANGE UP (ref 98–107)
CHLORIDE SERPL-SCNC: 103 MMOL/L — SIGNIFICANT CHANGE UP (ref 98–107)
CO2 SERPL-SCNC: 21 MMOL/L — LOW (ref 22–31)
CO2 SERPL-SCNC: 22 MMOL/L — SIGNIFICANT CHANGE UP (ref 22–31)
CREAT SERPL-MCNC: 0.95 MG/DL — SIGNIFICANT CHANGE UP (ref 0.5–1.3)
CREAT SERPL-MCNC: 1.02 MG/DL — SIGNIFICANT CHANGE UP (ref 0.5–1.3)
EGFR: 73 ML/MIN/1.73M2 — SIGNIFICANT CHANGE UP
EGFR: 79 ML/MIN/1.73M2 — SIGNIFICANT CHANGE UP
GAS PNL BLDV: SIGNIFICANT CHANGE UP
GLUCOSE BLDC GLUCOMTR-MCNC: 68 MG/DL — LOW (ref 70–99)
GLUCOSE BLDC GLUCOMTR-MCNC: 93 MG/DL — SIGNIFICANT CHANGE UP (ref 70–99)
GLUCOSE SERPL-MCNC: 110 MG/DL — HIGH (ref 70–99)
GLUCOSE SERPL-MCNC: 111 MG/DL — HIGH (ref 70–99)
HCT VFR BLD CALC: 47.6 % — SIGNIFICANT CHANGE UP (ref 39–50)
HGB BLD-MCNC: 16.4 G/DL — SIGNIFICANT CHANGE UP (ref 13–17)
INR BLD: 1.03 RATIO — SIGNIFICANT CHANGE UP (ref 0.85–1.18)
LACTATE SERPL-SCNC: 1.5 MMOL/L — SIGNIFICANT CHANGE UP (ref 0.5–2)
MAGNESIUM SERPL-MCNC: 2.3 MG/DL — SIGNIFICANT CHANGE UP (ref 1.6–2.6)
MCHC RBC-ENTMCNC: 32 PG — SIGNIFICANT CHANGE UP (ref 27–34)
MCHC RBC-ENTMCNC: 34.5 GM/DL — SIGNIFICANT CHANGE UP (ref 32–36)
MCV RBC AUTO: 93 FL — SIGNIFICANT CHANGE UP (ref 80–100)
NRBC # BLD: 0 /100 WBCS — SIGNIFICANT CHANGE UP (ref 0–0)
NRBC # FLD: 0 K/UL — SIGNIFICANT CHANGE UP (ref 0–0)
PHOSPHATE SERPL-MCNC: 3.8 MG/DL — SIGNIFICANT CHANGE UP (ref 2.5–4.5)
PLATELET # BLD AUTO: 245 K/UL — SIGNIFICANT CHANGE UP (ref 150–400)
POTASSIUM SERPL-MCNC: 4.1 MMOL/L — SIGNIFICANT CHANGE UP (ref 3.5–5.3)
POTASSIUM SERPL-MCNC: 4.6 MMOL/L — SIGNIFICANT CHANGE UP (ref 3.5–5.3)
POTASSIUM SERPL-SCNC: 4.1 MMOL/L — SIGNIFICANT CHANGE UP (ref 3.5–5.3)
POTASSIUM SERPL-SCNC: 4.6 MMOL/L — SIGNIFICANT CHANGE UP (ref 3.5–5.3)
PROTHROM AB SERPL-ACNC: 11.5 SEC — SIGNIFICANT CHANGE UP (ref 9.5–13)
RBC # BLD: 5.12 M/UL — SIGNIFICANT CHANGE UP (ref 4.2–5.8)
RBC # FLD: 13.2 % — SIGNIFICANT CHANGE UP (ref 10.3–14.5)
SODIUM SERPL-SCNC: 136 MMOL/L — SIGNIFICANT CHANGE UP (ref 135–145)
SODIUM SERPL-SCNC: 139 MMOL/L — SIGNIFICANT CHANGE UP (ref 135–145)
WBC # BLD: 9.24 K/UL — SIGNIFICANT CHANGE UP (ref 3.8–10.5)
WBC # FLD AUTO: 9.24 K/UL — SIGNIFICANT CHANGE UP (ref 3.8–10.5)

## 2024-01-22 PROCEDURE — 99232 SBSQ HOSP IP/OBS MODERATE 35: CPT

## 2024-01-22 PROCEDURE — 99239 HOSP IP/OBS DSCHRG MGMT >30: CPT

## 2024-01-22 RX ORDER — METOPROLOL TARTRATE 50 MG
1 TABLET ORAL
Refills: 0 | DISCHARGE

## 2024-01-22 RX ORDER — QUETIAPINE FUMARATE 200 MG/1
0.5 TABLET, FILM COATED ORAL
Qty: 15 | Refills: 0
Start: 2024-01-22 | End: 2024-02-20

## 2024-01-22 RX ORDER — ATORVASTATIN CALCIUM 80 MG/1
1 TABLET, FILM COATED ORAL
Qty: 30 | Refills: 0
Start: 2024-01-22 | End: 2024-02-20

## 2024-01-22 RX ORDER — LOSARTAN POTASSIUM 100 MG/1
1 TABLET, FILM COATED ORAL
Refills: 0 | DISCHARGE

## 2024-01-22 RX ORDER — OXYMETAZOLINE HYDROCHLORIDE 0.5 MG/ML
2 SPRAY NASAL
Refills: 0 | DISCHARGE

## 2024-01-22 RX ORDER — SACUBITRIL AND VALSARTAN 24; 26 MG/1; MG/1
1 TABLET, FILM COATED ORAL
Qty: 60 | Refills: 0
Start: 2024-01-22 | End: 2024-02-20

## 2024-01-22 RX ORDER — CLOPIDOGREL BISULFATE 75 MG/1
1 TABLET, FILM COATED ORAL
Qty: 30 | Refills: 3
Start: 2024-01-22 | End: 2024-05-20

## 2024-01-22 RX ORDER — HYDROCHLOROTHIAZIDE 25 MG
1 TABLET ORAL
Refills: 0 | DISCHARGE

## 2024-01-22 RX ORDER — CLOPIDOGREL BISULFATE 75 MG/1
1 TABLET, FILM COATED ORAL
Refills: 0 | DISCHARGE

## 2024-01-22 RX ORDER — METOPROLOL TARTRATE 50 MG
1 TABLET ORAL
Qty: 60 | Refills: 0
Start: 2024-01-22 | End: 2024-02-20

## 2024-01-22 RX ORDER — DAPAGLIFLOZIN 10 MG/1
1 TABLET, FILM COATED ORAL
Qty: 30 | Refills: 0
Start: 2024-01-22 | End: 2024-02-20

## 2024-01-22 RX ADMIN — Medication 81 MILLIGRAM(S): at 12:37

## 2024-01-22 RX ADMIN — Medication 1 DROP(S): at 12:48

## 2024-01-22 RX ADMIN — CLOPIDOGREL BISULFATE 75 MILLIGRAM(S): 75 TABLET, FILM COATED ORAL at 12:37

## 2024-01-22 RX ADMIN — Medication 50 MILLIGRAM(S): at 18:18

## 2024-01-22 RX ADMIN — Medication 50 MILLIGRAM(S): at 05:57

## 2024-01-22 RX ADMIN — SACUBITRIL AND VALSARTAN 1 TABLET(S): 24; 26 TABLET, FILM COATED ORAL at 18:18

## 2024-01-22 RX ADMIN — Medication 1 DROP(S): at 18:00

## 2024-01-22 RX ADMIN — SACUBITRIL AND VALSARTAN 1 TABLET(S): 24; 26 TABLET, FILM COATED ORAL at 05:57

## 2024-01-22 NOTE — PROGRESS NOTE ADULT - ASSESSMENT
83 year old man with PMHx early dementia? hx cardiomyopathy, s/p MDT ICD, glaucoma, CVA 1993? (ambulates with walker), HTN, DM2, CABG x4 1995, cardiac stent 1998, prostate cancer tx with radioactive seeds 2004, colon cancer tx with colon resection 2015 presenting with epigastric chest pain and admitted for NSTEMI. Device interrogation revealed the device was at ROLY and had no events. Of note patient recently saw EP Dr. Cruz with interrogation revealing ROLY with plan for downgrade to BiV PPM and generator change.  The alternatives, risks, and benefits were explained in detail which included but not limited to bleeding requiring transfusion, infection, stroke, plural effusion, esophageal injury, pericardial effusion, cardiac tamponade requiring chest tube, intubation, and death. Patient expressed understanding and all questions were answered. Patient was AOx3 (person, place and time) and was able to explain why his BiV-ICD is being downgraded and the risk associated with the procedure. Patient is s/p LHC yesterday dLAD 80%, mRCA 100%, SVG to OM mid 90% x1 LOVE + distal 70% x1 LOVE. Telemetry shows sinus rhythm with V pacing and AIVR up to 10 beats  1. Chest Pain - interrogation no AICD shocks or arrhthymias suspect ACS   2. ICM s/p MDT BiV AICD  3. CAD s/p CABG and stents    Plan:  - Continuous telemetry monitoring  - Monitor electrolytes and replete K to 4 and Mg to 2  - Continue metoprolol succinate and increase dose as BP tolerates  - Continue GDMT   - Plan for possible BiV ICD downgrade to BiV PPM today  - Keep patient NPO  - Hold SGLT2 inhibitors-  - Send BMP, VBG, beta hydroxy butyrate and check anion gap  - Continue care per primary team  83 year old man with PMHx early dementia? hx cardiomyopathy, s/p MDT ICD, glaucoma, CVA 1993? (ambulates with walker), HTN, DM2, CABG x4 1995, cardiac stent 1998, prostate cancer tx with radioactive seeds 2004, colon cancer tx with colon resection 2015 presenting with epigastric chest pain and admitted for NSTEMI. Device interrogation revealed the device was at ROLY and had no events. Of note patient recently saw EP Dr. Cruz with interrogation revealing ROLY with plan for downgrade to BiV PPM and generator change.  The alternatives, risks, and benefits were explained in detail which included but not limited to bleeding requiring transfusion, infection, stroke, plural effusion, esophageal injury, pericardial effusion, cardiac tamponade requiring chest tube, intubation, and death. Patient expressed understanding and all questions were answered. Patient was AOx3 (person, place and time) and was able to explain why his BiV-ICD is being downgraded and the risk associated with the procedure. Patient is s/p LHC yesterday dLAD 80%, mRCA 100%, SVG to OM mid 90% x1 LOVE + distal 70% x1 LOVE. Telemetry shows sinus rhythm with V pacing and AIVR up to 10 beats  1. Chest Pain - interrogation no AICD shocks or arrhthymias suspect ACS   2. ICM s/p MDT BiV AICD  3. CAD s/p CABG and stents    Plan:  - Continuous telemetry monitoring  - Monitor electrolytes and replete K to 4 and Mg to 2  - Continue metoprolol succinate and increase dose as BP tolerates  - Continue GDMT   - Plan for possible BiV ICD downgrade to BiV PPM today  - Keep patient NPO  - Hold SGLT2 inhibitors-  Addendum:   Patient received Farxiga yesterday. Beta Hydroxy_butyrate 1.1. Dr. Verdugo discussed with Dr. French from endocrine and ok to proceed with procedure (BIVICD downgrade to BIV PPM) today.  Repeat labs 1-2 hrs after the procedure  - Send BMP, VBG, beta hydroxy butyrate and check anion gap  - Continue care per primary team

## 2024-01-22 NOTE — DISCHARGE NOTE NURSING/CASE MANAGEMENT/SOCIAL WORK - PATIENT PORTAL LINK FT
You can access the FollowMyHealth Patient Portal offered by [facility] by registering at the following website: [email or web address]. By joining [employer]’s FollowMyHealth portal, you will also be able to view your health information using other applications (apps) compatible with our system.

## 2024-01-22 NOTE — PROGRESS NOTE ADULT - PROBLEM SELECTOR PLAN 4
EP planning for BiV AICD downgrade to PPM on 1/22  - appreciate EP consult EP planning for BiV AICD downgrade to PPM on 1/22  - Reviewed meds- Pt on [facility] which he got on 1/21.  -Discussed this  with EP and Endo- Advise to check BHB and AG. AG was 14, BHB was 1.1 Reviewed labs with endo attending. Ok to proceed with procedure. Advise to trend labs post procedur EP planning for BiV AICD downgrade to PPM on 1/22  - Reviewed meds- Pt on [facility] which he got on 1/21.  -Discussed this  with EP and Endo- Advise to check BHB and AG. AG was 14, BHB was 1.1 Reviewed labs with endo attending. Ok to proceed with procedure. Advise to trend labs post procedure  - [facility] EP Dr Cruz- plan to do the procedure as outpt- EP to schedule appointment for FU

## 2024-01-22 NOTE — PROGRESS NOTE ADULT - SUBJECTIVE AND OBJECTIVE BOX
Patient is seen and examined. Patient denies any chest pain, SOB, palpitations or dizziness. He is resting in bed. Answers questions appropriately    PAST MEDICAL & SURGICAL HISTORY:  CAD (Coronary Artery Disease) (ICD9 414.00)    Acute Myocardial Infarction (ICD9 410.90)  10/93 and 2/96    Diabetes Mellitus Type 2 in Nonobese (ICD9 250.00)    History of Prostate Cancer (ICD9 V10.46)  S/P seed implant 2004    HTN (Hypertension) (ICD9 401.9)    Legal Blindness,  right eye  right eye    Left Ventricular Dysfunction (ICD9 428.1)  h/o    Cholecystitis, Unspecified (ICD9 575.10)  S/P Drain 9/09    S/P CABG X 4 (ICD9 V45.81)  1996    Coronary Stent (ICD9 V45.82)  1998 @ [facility]    History of Appendectomy (ICD9 V45.89)  1962    ACID/Pacer  Medtronic model # [id number]    BPH (Benign Prostatic Hyperplasia)    DM (Diabetes Mellitus)    Migraines    Prostate Ca  s/p seed implants 12/2004    Back Pain    Hypercholesteremia    Seasonal Allergies    Unspecified systolic (congestive) heart failure    Glaucoma    CVA (cerebrovascular accident)    Colon cancer    S/P Appendectomy  1962    S/P CABG X 4  1996    S/P Coronary Angiogram  1 stent placement 1998    Radium seed implants  12/2004    Cardiac Pacemaker,  Defibrillator  11/7/2009    AICD (automatic cardioverter/defibrillator) present  Medtronic    S/P colon resection        MEDICATIONS  (STANDING):  aspirin enteric coated 81 milliGRAM(s) Oral daily  atorvastatin 40 milliGRAM(s) Oral at bedtime  clopidogrel Tablet 75 milliGRAM(s) Oral daily  dapagliflozin 10 milliGRAM(s) Oral every 24 hours  dextrose 50% Injectable 25 Gram(s) IV Push once  dextrose 50% Injectable 25 Gram(s) IV Push once  dextrose 50% Injectable 12.5 Gram(s) IV Push once  glucagon  Injectable 1 milliGRAM(s) IntraMuscular once  influenza  Vaccine (HIGH DOSE) 0.7 milliLiter(s) IntraMuscular once  insulin lispro (ADMELOG) corrective regimen sliding scale   SubCutaneous at bedtime  insulin lispro (ADMELOG) corrective regimen sliding scale   SubCutaneous three times a day before meals  latanoprost 0.005% Ophthalmic Solution 1 Drop(s) Both EYES at bedtime  melatonin 6 milliGRAM(s) Oral at bedtime  metoprolol succinate ER 50 milliGRAM(s) Oral two times a day  QUEtiapine 12.5 milliGRAM(s) Oral at bedtime  sacubitril 24 mG/valsartan 26 mG 1 Tablet(s) Oral two times a day  timolol 0.5% Solution 1 Drop(s) Both EYES two times a day    MEDICATIONS  (PRN):  dextrose Oral Gel 15 Gram(s) Oral once PRN Blood Glucose LESS THAN 70 milliGRAM(s)/deciliter  OLANZapine Injectable 1.25 milliGRAM(s) IntraMuscular every 6 hours PRN agitation  oxymetazoline 0.05% Nasal Spray 2 Spray(s) Both Nostrils two times a day PRN Congestion    Vital Signs Last 24 Hrs  T(C): 36.3 (21 Jan 2024 21:57), Max: 36.9 (21 Jan 2024 17:00)  T(F): 97.4 (21 Jan 2024 21:57), Max: 98.4 (21 Jan 2024 17:00)  HR: 66 (22 Jan 2024 05:35) (66 - 86)  BP: 103/63 (22 Jan 2024 05:35) (103/63 - 116/84)  BP(mean): 90 (21 Jan 2024 15:21) (90 - 90)  RR: 17 (22 Jan 2024 05:35) (16 - 18)  SpO2: 97% (22 Jan 2024 05:35) (95% - 100%)    Parameters below as of 22 Jan 2024 05:35  Patient On (Oxygen Delivery Method): room air      INTERPRETATION OF TELEMETRY: Sinus rhythm with V pacing and occ. AV pacing; HR 80s-100s, AIVR up to 10 beats    LABS:                        16.4   9.24  )-----------( 245      ( 22 Jan 2024 06:10 )             47.6     01-21    135  |  101  |  22  ----------------------------<  158<H>  4.2   |  21<L>  |  1.00    Ca    9.3      21 Jan 2024 18:36  Phos  4.0     01-21  Mg     1.90     01-21      PT/INR - ( 22 Jan 2024 06:10 )   PT: 11.5 sec;   INR: 1.03 ratio         PTT - ( 22 Jan 2024 06:10 )  PTT:36.9 sec  Urinalysis Basic - ( 21 Jan 2024 18:36 )    Color: x / Appearance: x / SG: x / pH: x  Gluc: 158 mg/dL / Ketone: x  / Bili: x / Urobili: x   Blood: x / Protein: x / Nitrite: x   Leuk Esterase: x / RBC: x / WBC x   Sq Epi: x / Non Sq Epi: x / Bacteria: x      I&O's Summary    BNP  RADIOLOGY & ADDITIONAL STUDIES:      PHYSICAL EXAM:    GENERAL: In no apparent distress, well nourished, and hydrated.  HEAD:  Atraumatic, Normocephalic  EYES: EOMI, PERRLA, conjunctiva and sclera clear  ENMT: No tonsillar erythema, exudates, or enlargement; Moist mucous membranes, Good dentition, No lesions  NECK: Supple and normal thyroid.  No JVD or carotid bruit.  Carotid pulse is 2+ bilaterally.  HEART: Regular rate and rhythm; No murmurs, rubs, or gallops.  PULMONARY: Clear to auscultation and percussion.  No rales, wheezing, or rhonchi bilaterally.  ABDOMEN: Soft, Nontender, Nondistended; Bowel sounds present  EXTREMITIES:  2+ Peripheral Pulses, No clubbing, cyanosis, or edema  LYMPH: No lymphadenopathy noted  NEUROLOGICAL: Grossly nonfocal         Patient is seen and examined. Patient denies any chest pain, SOB, palpitations or dizziness. He is resting in bed. Answers questions appropriately    PAST MEDICAL & SURGICAL HISTORY:  CAD (Coronary Artery Disease) (ICD9 414.00)    Acute Myocardial Infarction (ICD9 410.90)  10/93 and 2/96    Diabetes Mellitus Type 2 in Nonobese (ICD9 250.00)    History of Prostate Cancer (ICD9 V10.46)  S/P seed implant 2004    HTN (Hypertension) (ICD9 401.9)    Legal Blindness,  right eye  right eye    Left Ventricular Dysfunction (ICD9 428.1)  h/o    Cholecystitis, Unspecified (ICD9 575.10)  S/P Drain 9/09    S/P CABG X 4 (ICD9 V45.81)  1996    Coronary Stent (ICD9 V45.82)  1998 @ [facility]    History of Appendectomy (ICD9 V45.89)  1962    ACID/Pacer  Medtronic model # [id number]    BPH (Benign Prostatic Hyperplasia)    DM (Diabetes Mellitus)    Migraines    Prostate Ca  s/p seed implants 12/2004    Back Pain    Hypercholesteremia    Seasonal Allergies    Unspecified systolic (congestive) heart failure    Glaucoma    CVA (cerebrovascular accident)    Colon cancer    S/P Appendectomy  1962    S/P CABG X 4  1996    S/P Coronary Angiogram  1 stent placement 1998    Radium seed implants  12/2004    Cardiac Pacemaker,  Defibrillator  11/7/2009    AICD (automatic cardioverter/defibrillator) present  Medtronic    S/P colon resection        MEDICATIONS  (STANDING):  aspirin enteric coated 81 milliGRAM(s) Oral daily  atorvastatin 40 milliGRAM(s) Oral at bedtime  clopidogrel Tablet 75 milliGRAM(s) Oral daily  dapagliflozin 10 milliGRAM(s) Oral every 24 hours  dextrose 50% Injectable 25 Gram(s) IV Push once  dextrose 50% Injectable 25 Gram(s) IV Push once  dextrose 50% Injectable 12.5 Gram(s) IV Push once  glucagon  Injectable 1 milliGRAM(s) IntraMuscular once  influenza  Vaccine (HIGH DOSE) 0.7 milliLiter(s) IntraMuscular once  insulin lispro (ADMELOG) corrective regimen sliding scale   SubCutaneous at bedtime  insulin lispro (ADMELOG) corrective regimen sliding scale   SubCutaneous three times a day before meals  latanoprost 0.005% Ophthalmic Solution 1 Drop(s) Both EYES at bedtime  melatonin 6 milliGRAM(s) Oral at bedtime  metoprolol succinate ER 50 milliGRAM(s) Oral two times a day  QUEtiapine 12.5 milliGRAM(s) Oral at bedtime  sacubitril 24 mG/valsartan 26 mG 1 Tablet(s) Oral two times a day  timolol 0.5% Solution 1 Drop(s) Both EYES two times a day    MEDICATIONS  (PRN):  dextrose Oral Gel 15 Gram(s) Oral once PRN Blood Glucose LESS THAN 70 milliGRAM(s)/deciliter  OLANZapine Injectable 1.25 milliGRAM(s) IntraMuscular every 6 hours PRN agitation  oxymetazoline 0.05% Nasal Spray 2 Spray(s) Both Nostrils two times a day PRN Congestion    Vital Signs Last 24 Hrs  T(C): 36.3 (21 Jan 2024 21:57), Max: 36.9 (21 Jan 2024 17:00)  T(F): 97.4 (21 Jan 2024 21:57), Max: 98.4 (21 Jan 2024 17:00)  HR: 66 (22 Jan 2024 05:35) (66 - 86)  BP: 103/63 (22 Jan 2024 05:35) (103/63 - 116/84)  BP(mean): 90 (21 Jan 2024 15:21) (90 - 90)  RR: 17 (22 Jan 2024 05:35) (16 - 18)  SpO2: 97% (22 Jan 2024 05:35) (95% - 100%)    Parameters below as of 22 Jan 2024 05:35  Patient On (Oxygen Delivery Method): room air      INTERPRETATION OF TELEMETRY: Sinus rhythm with V pacing and occ. AV pacing; HR 80s-100s, AIVR up to 10 beats    LABS:                        16.4   9.24  )-----------( 245      ( 22 Jan 2024 06:10 )             47.6     01-21    135  |  101  |  22  ----------------------------<  158<H>  4.2   |  21<L>  |  1.00    Ca    9.3      21 Jan 2024 18:36  Phos  4.0     01-21  Mg     1.90     01-21      PT/INR - ( 22 Jan 2024 06:10 )   PT: 11.5 sec;   INR: 1.03 ratio         PTT - ( 22 Jan 2024 06:10 )  PTT:36.9 sec  Urinalysis Basic - ( 21 Jan 2024 18:36 )    Color: x / Appearance: x / SG: x / pH: x  Gluc: 158 mg/dL / Ketone: x  / Bili: x / Urobili: x   Blood: x / Protein: x / Nitrite: x   Leuk Esterase: x / RBC: x / WBC x   Sq Epi: x / Non Sq Epi: x / Bacteria: x      PHYSICAL EXAM:    GENERAL: In no apparent distress, well nourished, and hydrated.  HEART: Regular rate and rhythm; No murmurs, rubs, or gallops.  PULMONARY: Clear to auscultation and percussion.  No rales, wheezing, or rhonchi bilaterally.  ABDOMEN: Soft, Nontender, Nondistended; Bowel sounds present  EXTREMITIES:  2+ Peripheral Pulses, No clubbing, cyanosis, or edema

## 2024-01-22 NOTE — PROGRESS NOTE ADULT - PROBLEM SELECTOR PROBLEM 2
Acute systolic heart failure
Acute encephalopathy
Acute systolic heart failure
Acute encephalopathy

## 2024-01-22 NOTE — DISCHARGE NOTE NURSING/CASE MANAGEMENT/SOCIAL WORK - NSDCPEFALRISK_GEN_ALL_CORE
For information on Fall & Injury Prevention, visit: [email or web address]  [email or web address] OR  https://www.cdc.gov/steadi/patient.html

## 2024-01-22 NOTE — PROGRESS NOTE ADULT - REASON FOR ADMISSION
left sided chest pain and epigastric pain x1 day

## 2024-01-22 NOTE — PROGRESS NOTE ADULT - ASSESSMENT
83M w/ PMHx of [facility] s/p MDT CRT-D, CAD s/p CABG 95' and stents, HTN, T2DM, Prostate Ca, p/w chest pain and rising troponins concerning for NSTEMI, ss/p eparint gtt, underwent LHC 1/16/24: SVG to OM mid 90% x1 LOVE + distal 70% x1 LOVE, RFA accessed,    83M w/ PMHx of [facility] s/p MDT CRT-D, CAD s/p CABG 95' and stents, HTN, T2DM, Prostate Ca, p/w chest pain. R/I  NSTEMI with peaked trops > 1.2K and underwent  [facility] 1/16/24: LOVE SVG to OM mid 90% x1 LOVE + distal 70% x1 LOVE.    Plan  Patient is a __M/F with PMHx, HTN, HLD DM2, CAD presenting with intermittent chest pain and admitted for NSTEMI/ACS R/O.   Patient  s/p LHC ___ revealing_____________, RRA/RFA/LFA accessed    Plan  -continue ASA 81 mg QD,  Plavix 75 mg QD for DAPT for 1 year  -continue Atorvastatin 40mg QHS. Metoprolol Sucinate  -EKG:  -TTE:  -HTN: c/w __________  -DM: Hold Metformin x 48 hours, restart  -SUSAN/CKD: Cr _______, now_________  -Monitor electrolytes and replete K to 4 and Mg to 2   -Follow up with outpatient cardiology in 1-2 weeks   RFA accessed,   83M w/ PMHx of [facility] s/p MDT CRT-D, CAD s/p CABG 95' and stents, HTN, T2DM, Prostate Ca, p/w chest pain. R/I  NSTEMI with peaked trops > 1.2K and underwent  [facility] 1/16/24: SVG to OM mid 90% x1 LOVE + distal 70% x1 LOVE.     Multiple episodes of NSVT O/N, ASx, EP following with tentative plans BIV ICD downgrade to BIV PPM on Monday 1/22/24    Plan  #R/I NSTEMI  -Currently CP free, HDS  -continue ASA 81 mg QD,  Plavix 75 mg QD for DAPT for 1 year  -continue Atorvastatin 40mg QHS. Metoprolol Succinate 50mg QD  -EKG V-Paced, no acute ERICKA or TWI  -TTE: EF 23%, global LV hypokinesis , calcification of MV and AV, mild MR/AR.   -Initiated on GDMT: Entresto 24/26mg BID and Farxiga 10mg QD, Cr wnl  -Monitor electrolytes and replete K to 4 and Mg to 2   -Follow up with outpatient cardiology in 1 week for post discharge check-up  -RFA accessed, stable, w/o hematoma or bleed, pulses intact   declines

## 2024-01-22 NOTE — PROGRESS NOTE ADULT - PROVIDER SPECIALTY LIST ADULT
Electrophysiology
Electrophysiology
Intervent Cardiology
Cardiology
Electrophysiology
Intervent Cardiology
Hospitalist

## 2024-01-22 NOTE — PROGRESS NOTE ADULT - PROBLEM SELECTOR PLAN 8
- Diet: Diabetic  - DVT ppx: Lovenox  - Dispo: Pending PPM. PT stated home with no skilled PT needs - Diet: Diabetic  - DVT ppx: Lovenox  - Dispo: Pending PPM. PT stated home with no skilled PT needs  - Discussed with Niece at bedside  - Pt has appointment with his Cardiologist Dr Helen Moe on Feb 2nd - Diet: Diabetic  - DVT ppx: Lovenox  - Dispo: Pending PPM. PT stated home with no skilled PT needs  - Discussed with Niece at bedside  - Pt has appointment with his Cardiologist Dr Helen Moe on Feb 2nd  - DW EP= EP to schedule appointment for FU for AICD downgrade-   - DC planning 33mins

## 2024-01-22 NOTE — PROGRESS NOTE ADULT - PROBLEM SELECTOR PLAN 3
- as above, on asa/plavix/statin + heparin gtt  - pending [facility] today   - TTE EF 23%
metabolic encephalopathy from NSTEMI and hospitalization - contributed by baseline vascular dementia  - 1:1 for safety overnight  - will place seroquel qHS  - avoid opioids, benzos, anticholinergics  - monitor off 1:1
- as above, on asa/plavix/statin + heparin gtt  - pending C  - pending TTE
metabolic encephalopathy from NSTEMI and hospitalization - contributed by baseline vascular dementia  - 1:1 for safety overnight  - Seroquel qHS  - avoid opioids, benzos, anticholinergics  - monitor off 1:1
metabolic encephalopathy from NSTEMI and hospitalization - contributed by baseline vascular dementia  - 1:1 for safety overnight  - Seroquel qHS  - avoid opioids, benzos, anticholinergics  - monitor off 1:1
metabolic encephalopathy from NSTEMI and hospitalization - contributed by baseline vascular dementia  - 1:1 for safety overnight  - will place seroquel qHS  - avoid opioids, benzos, anticholinergics  - monitor off 1:1

## 2024-01-22 NOTE — PROGRESS NOTE ADULT - PROBLEM SELECTOR PLAN 1
- Troponin peaked at 1237  - s/p LHC with LOVE x2  - [facility] 1/18: dLAD 80%, mRCA 100%, SVG to OM mid 90% x1 LOVE + distal 70% x1 LOVE  - CTH negative for ICH  - ASA and Plavix for 1 year  - Atorvastatin 40  - LDL 75 Jan 2024
CP on presentation, no recurrence   - trop 99-->1209 (peak 1237)   - c/w tele  - TTE 1/18: EF 23%, global left ventricular hypokinesis  - on heparin gtt, ends today about 5pm (48 hours)  - s/p ASA 162mg, c/w 81mg qd  - s/p Plavix 600mg, c/w 75mg qd  - CTH negative for ICH  - pending [facility]  - c/w metoprolol with hold parameters  - c/w statin
- Troponin peaked at 1237  - s/p LHC with LOVE x2  - [facility] 1/18: dLAD 80%, mRCA 100%, SVG to OM mid 90% x1 LOVE + distal 70% x1 LOVE  - CTH negative for ICH  - ASA and Plavix for 1 year  - Atorvastatin 40  - LDL 75 Jan 2024
CP on presentation, no recurrence   - trop 99-->1209 (peak 1237)   - c/w tele  - TTE pending  - on heparin gtt   - s/p ASA 162mg, c/w 81mg qd  - s/p plavix 600mg, c/w 75mg qd  - CTH negative for ICH, heparin gtt resumed by RN  - GUILLERMINA @ [place] for LHC    - c/w metoprolol with hold parameters  - c/w statin
s/p LHC with LOVE x2  - CTH negative for ICH  - ASA and Plavix for 1 year  - Lipitor  - c/w metoprolol with hold parameters
- Troponin peaked at 1237  - s/p LHC with LOVE x2  - [facility] 1/18: dLAD 80%, mRCA 100%, SVG to OM mid 90% x1 LOVE + distal 70% x1 LOVE  - CTH negative for ICH  - ASA and Plavix for 1 year  - Atorvastatin 40  - LDL 75 Jan 2024

## 2024-01-22 NOTE — PROGRESS NOTE ADULT - SUBJECTIVE AND OBJECTIVE BOX
Patient is a 83y old  Male who presents with a chief complaint of left sided chest pain and epigastric pain x1 day (20 Jan 2024 11:59)      INTERVAL HPI/OVERNIGHT EVENTS: Overnight, had beats of NSVT then later 30+ beats. Pt asymptomatic and spontaneously reverted to paced rhythm.        REVIEW OF SYSTEMS:    CONSTITUTIONAL: No weakness, fevers or chills  EYES/ENT: No visual changes;  No vertigo or throat pain   NECK: No pain or stiffness  RESPIRATORY: No cough, wheezing, hemoptysis; No shortness of breath  CARDIOVASCULAR: No chest pain or palpitations  GASTROINTESTINAL: No abdominal or epigastric pain. No nausea, vomiting, or hematemesis; No diarrhea or constipation. No melena or hematochezia.  GENITOURINARY: No dysuria, frequency or hematuria  NEUROLOGICAL: No numbness or weakness  All other review of systems is negative unless indicated above.      Vital Signs Last 24 Hrs  T(C): 36.3 (21 Jan 2024 21:57), Max: 36.9 (21 Jan 2024 17:00)  T(F): 97.4 (21 Jan 2024 21:57), Max: 98.4 (21 Jan 2024 17:00)  HR: 66 (22 Jan 2024 05:35) (66 - 86)  BP: 103/63 (22 Jan 2024 05:35) (103/63 - 116/84)  BP(mean): 90 (21 Jan 2024 15:21) (90 - 90)  RR: 17 (22 Jan 2024 05:35) (16 - 18)  SpO2: 97% (22 Jan 2024 05:35) (95% - 100%)    Parameters below as of 22 Jan 2024 05:35  Patient On (Oxygen Delivery Method): room air        MEDICATIONS  (STANDING):  aspirin enteric coated 81 milliGRAM(s) Oral daily  atorvastatin 40 milliGRAM(s) Oral at bedtime  clopidogrel Tablet 75 milliGRAM(s) Oral daily  dapagliflozin 10 milliGRAM(s) Oral every 24 hours  dextrose 50% Injectable 25 Gram(s) IV Push once  dextrose 50% Injectable 25 Gram(s) IV Push once  dextrose 50% Injectable 12.5 Gram(s) IV Push once  glucagon  Injectable 1 milliGRAM(s) IntraMuscular once  influenza  Vaccine (HIGH DOSE) 0.7 milliLiter(s) IntraMuscular once  insulin lispro (ADMELOG) corrective regimen sliding scale   SubCutaneous three times a day before meals  insulin lispro (ADMELOG) corrective regimen sliding scale   SubCutaneous at bedtime  latanoprost 0.005% Ophthalmic Solution 1 Drop(s) Both EYES at bedtime  melatonin 6 milliGRAM(s) Oral at bedtime  metoprolol succinate ER 50 milliGRAM(s) Oral two times a day  QUEtiapine 12.5 milliGRAM(s) Oral at bedtime  sacubitril 24 mG/valsartan 26 mG 1 Tablet(s) Oral two times a day  timolol 0.5% Solution 1 Drop(s) Both EYES two times a day    MEDICATIONS  (PRN):  dextrose Oral Gel 15 Gram(s) Oral once PRN Blood Glucose LESS THAN 70 milliGRAM(s)/deciliter  OLANZapine Injectable 1.25 milliGRAM(s) IntraMuscular every 6 hours PRN agitation  oxymetazoline 0.05% Nasal Spray 2 Spray(s) Both Nostrils two times a day PRN Congestion      PHYSICAL EXAM:  GENERAL: NAD, well-groomed, well-developed  HEAD:  Atraumatic, Normocephalic  EYES: EOMI, PERRLA, conjunctiva and sclera clear  ENMT: No tonsillar erythema, exudates, or enlargement; Moist mucous membranes, Good dentition, No lesions  NECK: Supple, No JVD, Normal thyroid  NERVOUS SYSTEM:  Alert & Oriented X3, Good concentration  CHEST/LUNG: Clear to percussion bilaterally; No rales, rhonchi, wheezing, or rubs. PPM palpable over L chest wall  HEART: Regular rate and rhythm; No murmurs, rubs, or gallops  ABDOMEN: Soft, Nontender, Nondistended; Bowel sounds present  EXTREMITIES:  2+ Peripheral Pulses, No clubbing, cyanosis, or edema  LYMPH: No lymphadenopathy noted  SKIN: No rashes or lesions    Consultant(s) Notes Reviewed:  [x ] YES  [ ] NO  Care Discussed with Consultants/Other Providers [ x] YES  [ ] NO    LABS:                                              16.4   9.24  )-----------( 245      ( 22 Jan 2024 06:10 )             47.6       01-21    135  |  101  |  22  ----------------------------<  158<H>  4.2   |  21<L>  |  1.00    Ca    9.3      21 Jan 2024 18:36  Phos  4.0     01-21  Mg     1.90     01-21      CAPILLARY BLOOD GLUCOSE  POCT Blood Glucose.: 101 mg/dL (22 Jan 2024 08:41)  POCT Blood Glucose.: 94 mg/dL (21 Jan 2024 22:19)  POCT Blood Glucose.: 138 mg/dL (21 Jan 2024 18:56)  POCT Blood Glucose.: 155 mg/dL (21 Jan 2024 12:38)  BLOOD CULTURE      RADIOLOGY & ADDITIONAL TESTS:    Imaging Personally Reviewed:  [ ] YES  [ ] NO         Eve Verdugo  Hospitalist  Pager- [phone]    Patient is a 83y old  Male who presents with a chief complaint of left sided chest pain and epigastric pain x1 day (20 Jan 2024 11:59)    INTERVAL HPI/OVERNIGHT EVENTS:   Reviewed tele- Pt had  27 beats of NSVT  Asymptomatic and spontaneously reverted to paced rhythm.    NIece at bedside who is a nurse  Pt denies CP/SOB. Walked with PT this AM.       Vital Signs Last 24 Hrs  T(C): 36.3 (01-22-24 @ 12:45), Max: 36.9 (01-21-24 @ 17:00)  T(F): 97.4 (01-22-24 @ 12:45), Max: 98.4 (01-21-24 @ 17:00)  HR: 71 (01-22-24 @ 12:45) (66 - 86)  BP: 109/74 (01-22-24 @ 12:45) (103/63 - 115/80)  BP(mean): 90 (01-21-24 @ 15:21) (90 - 90)  RR: 17 (01-22-24 @ 12:45) (16 - 17)  SpO2: 98% (01-22-24 @ 12:45) (95% - 100%)      MEDICATIONS  (STANDING):  aspirin enteric coated 81 milliGRAM(s) Oral daily  atorvastatin 40 milliGRAM(s) Oral at bedtime  clopidogrel Tablet 75 milliGRAM(s) Oral daily  dextrose 50% Injectable 12.5 Gram(s) IV Push once  dextrose 50% Injectable 25 Gram(s) IV Push once  dextrose 50% Injectable 25 Gram(s) IV Push once  glucagon  Injectable 1 milliGRAM(s) IntraMuscular once  influenza  Vaccine (HIGH DOSE) 0.7 milliLiter(s) IntraMuscular once  insulin lispro (ADMELOG) corrective regimen sliding scale   SubCutaneous three times a day before meals  insulin lispro (ADMELOG) corrective regimen sliding scale   SubCutaneous at bedtime  latanoprost 0.005% Ophthalmic Solution 1 Drop(s) Both EYES at bedtime  melatonin 6 milliGRAM(s) Oral at bedtime  metoprolol succinate ER 50 milliGRAM(s) Oral two times a day  QUEtiapine 12.5 milliGRAM(s) Oral at bedtime  sacubitril 24 mG/valsartan 26 mG 1 Tablet(s) Oral two times a day  timolol 0.5% Solution 1 Drop(s) Both EYES two times a day    MEDICATIONS  (PRN):  dextrose Oral Gel 15 Gram(s) Oral once PRN Blood Glucose LESS THAN 70 milliGRAM(s)/deciliter  OLANZapine Injectable 1.25 milliGRAM(s) IntraMuscular every 6 hours PRN agitation  oxymetazoline 0.05% Nasal Spray 2 Spray(s) Both Nostrils two times a day PRN Congestion    PHYSICAL EXAM:  GENERAL: NAD, well-groomed, well-developed  HEAD:  Atraumatic, Normocephalic  EYES: EOMI, PERRLA, conjunctiva and sclera clear  ENMT: No tonsillar erythema, exudates, or enlargement; Moist mucous membranes, Good dentition, No lesions  NECK: Supple, No JVD, Normal thyroid  NERVOUS SYSTEM:  Alert & Oriented X3, Good concentration  CHEST/LUNG: Clear to percussion bilaterally; No rales, rhonchi, wheezing, or rubs. PPM palpable over L chest wall  HEART: Regular rate and rhythm; No murmurs, rubs, or gallops  ABDOMEN: Soft, Nontender, Nondistended; Bowel sounds present  EXTREMITIES:  2+ Peripheral Pulses, No clubbing, cyanosis, or edema  LYMPH: No lymphadenopathy noted  SKIN: No rashes or lesions    Consultant(s) Notes Reviewed:  [x ] YES  [ ] NO  Care Discussed with Consultants/Other Providers [ x] YES  [ ] NO    LABS:                                                     16.4   9.24  )-----------( 245      ( 22 Jan 2024 06:10 )             47.6       01-22    136  |  103  |  16  ----------------------------<  110<H>  4.1   |  21<L>  |  0.95    Ca    9.3      22 Jan 2024 11:00  Phos  3.8     01-22  Mg     2.30     01-22        CAPILLARY BLOOD GLUCOSE      POCT Blood Glucose.: 93 mg/dL (22 Jan 2024 12:12)  POCT Blood Glucose.: 101 mg/dL (22 Jan 2024 08:41)  POCT Blood Glucose.: 94 mg/dL (21 Jan 2024 22:19)  POCT Blood Glucose.: 138 mg/dL (21 Jan 2024 18:56)      RADIOLOGY & ADDITIONAL TESTS:    Imaging Personally Reviewed:  [ ] YES  [ ] NO

## 2024-01-22 NOTE — PROGRESS NOTE ADULT - SUBJECTIVE AND OBJECTIVE BOX
Patient seen and examined at bedside. Pt denies active CP, SOB, palpitations, diaphoresis, orthopnea, PND, dizziness, syncope, abdominal pain/discomfort, LE swelling or any other complaints at this time    No acute events overnight      REVIEW OF SYSTEMS:  Constitutional:     [ ] negative [ ] fevers [ ] chills [ ] weight loss [ ] weight gain  HEENT:                  [ ] negative [ ] dry eyes [ ] eye irritation [ ] postnasal drip [ ] nasal congestion  CV:                         [ ] negative  [ ] chest pain [ ] orthopnea [ ] palpitations [ ] murmur  Resp:                     [ ] negative [ ] cough [ ] shortness of breath [ ] dyspnea [ ] wheezing [ ] sputum [ ]hemoptysis  GI:                          [ ] negative [ ] nausea [ ] vomiting [ ] diarrhea [ ] constipation [ ] abd pain [ ] dysphagia   :                        [ ] negative [ ] dysuria [ ] nocturia [ ] hematuria [ ] increased urinary frequency  Musculoskeletal: [ ] negative [ ] back pain [ ] myalgias [ ] arthralgias [ ] fracture  Skin:                       [ ] negative [ ] rash [ ] itch  Neurological:        [ ] negative [ ] headache [ ] dizziness [ ] syncope [ ] weakness [ ] numbness  Psychiatric:           [ ] negative [ ] anxiety [ ] depression  Endocrine:            [ ] negative [ ] diabetes [ ] thyroid problem  Heme/Lymph:      [ ] negative [ ] anemia [ ] bleeding problem  Allergic/Immune: [ ] negative [ ] itchy eyes [ ] nasal discharge [ ] hives [ ] angioedema    [x ] All other systems negative  [ ] Unable to assess ROS due to    Current Meds:  aspirin enteric coated 81 milliGRAM(s) Oral daily  atorvastatin 40 milliGRAM(s) Oral at bedtime  clopidogrel Tablet 75 milliGRAM(s) Oral daily  dextrose 50% Injectable 12.5 Gram(s) IV Push once  dextrose 50% Injectable 25 Gram(s) IV Push once  dextrose 50% Injectable 25 Gram(s) IV Push once  dextrose Oral Gel 15 Gram(s) Oral once PRN  glucagon  Injectable 1 milliGRAM(s) IntraMuscular once  influenza  Vaccine (HIGH DOSE) 0.7 milliLiter(s) IntraMuscular once  insulin lispro (ADMELOG) corrective regimen sliding scale   SubCutaneous three times a day before meals  insulin lispro (ADMELOG) corrective regimen sliding scale   SubCutaneous at bedtime  latanoprost 0.005% Ophthalmic Solution 1 Drop(s) Both EYES at bedtime  melatonin 6 milliGRAM(s) Oral at bedtime  metoprolol succinate ER 50 milliGRAM(s) Oral two times a day  OLANZapine Injectable 1.25 milliGRAM(s) IntraMuscular every 6 hours PRN  oxymetazoline 0.05% Nasal Spray 2 Spray(s) Both Nostrils two times a day PRN  QUEtiapine 12.5 milliGRAM(s) Oral at bedtime  sacubitril 24 mG/valsartan 26 mG 1 Tablet(s) Oral two times a day  timolol 0.5% Solution 1 Drop(s) Both EYES two times a day      PAST MEDICAL & SURGICAL HISTORY:  CAD (Coronary Artery Disease) (ICD9 414.00)      Acute Myocardial Infarction (ICD9 410.90)  10/93 and 2/96      Diabetes Mellitus Type 2 in Nonobese (ICD9 250.00)      History of Prostate Cancer (ICD9 V10.46)  S/P seed implant 2004      HTN (Hypertension) (ICD9 401.9)      Legal Blindness,  right eye  right eye      Left Ventricular Dysfunction (ICD9 428.1)  h/o      ACID/Pacer  Medtronic model # [id number]      BPH (Benign Prostatic Hyperplasia)      Back Pain      Hypercholesteremia      Unspecified systolic (congestive) heart failure      Glaucoma      CVA (cerebrovascular accident)      Colon cancer      S/P Appendectomy  1962      S/P CABG X 4  1996      S/P Coronary Angiogram  1 stent placement 1998      Radium seed implants  12/2004      Cardiac Pacemaker,  Defibrillator  11/7/2009      AICD (automatic cardioverter/defibrillator) present  Medtronic      S/P colon resection          Vitals:  T(F): 97.4 (01-22), Max: 98.4 (01-21)  HR: 71 (01-22) (66 - 86)  BP: 109/74 (01-22) (103/63 - 115/80)  RR: 17 (01-22)  SpO2: 98% (01-22)  I&O's Summary    Physical Exam:  Appearance: No acute distress; well appearing  Neck: Supple, no JVD B/L, no Carotid Bruit B/L  Cardiovascular: RRR, S1, S2, no murmurs, rubs, or gallops, no edema  Respiratory: Clear to auscultation bilaterally, no RRW B/L  Gastrointestinal: soft, non-tender, non-distended with normal bowel sounds  Neurologic: No focal or neuro deficits noted  Psychiatry: AAOx3, mood & affect appropriate  Access Site: RCFA, Stable, w/o hematoma or bleed, pulses intact, back to baseline                            16.4   9.24  )-----------( 245      ( 22 Jan 2024 06:10 )             47.6     01-22    136  |  103  |  16  ----------------------------<  110<H>  4.1   |  21<L>  |  0.95    Ca    9.3      22 Jan 2024 11:00  Phos  3.8     01-22  Mg     2.30     01-22      PT/INR - ( 22 Jan 2024 06:10 )   PT: 11.5 sec;   INR: 1.03 ratio         PTT - ( 22 Jan 2024 06:10 )  PTT:36.9 sec              New ECG(s): Personally reviewed    Echo:    Stress Testing:     Cath:    Imaging:    Interpretation of Telemetry:   Patient seen and examined at bedside. Pt denies active CP, SOB, palpitations, diaphoresis, orthopnea, PND, dizziness, syncope, abdominal pain/discomfort, LE swelling or any other complaints at this time    No acute events overnight      REVIEW OF SYSTEMS:  Constitutional:     [ ] negative [ ] fevers [ ] chills [ ] weight loss [ ] weight gain  HEENT:                  [ ] negative [ ] dry eyes [ ] eye irritation [ ] postnasal drip [ ] nasal congestion  CV:                         [ ] negative  [ ] chest pain [ ] orthopnea [ ] palpitations [ ] murmur  Resp:                     [ ] negative [ ] cough [ ] shortness of breath [ ] dyspnea [ ] wheezing [ ] sputum [ ]hemoptysis  GI:                          [ ] negative [ ] nausea [ ] vomiting [ ] diarrhea [ ] constipation [ ] abd pain [ ] dysphagia   :                        [ ] negative [ ] dysuria [ ] nocturia [ ] hematuria [ ] increased urinary frequency  Musculoskeletal: [ ] negative [ ] back pain [ ] myalgias [ ] arthralgias [ ] fracture  Skin:                       [ ] negative [ ] rash [ ] itch  Neurological:        [ ] negative [ ] headache [ ] dizziness [ ] syncope [ ] weakness [ ] numbness  Psychiatric:           [ ] negative [ ] anxiety [ ] depression  Endocrine:            [ ] negative [ ] diabetes [ ] thyroid problem  Heme/Lymph:      [ ] negative [ ] anemia [ ] bleeding problem  Allergic/Immune: [ ] negative [ ] itchy eyes [ ] nasal discharge [ ] hives [ ] angioedema    [x ] All other systems negative  [ ] Unable to assess ROS due to    Current Meds:  aspirin enteric coated 81 milliGRAM(s) Oral daily  atorvastatin 40 milliGRAM(s) Oral at bedtime  clopidogrel Tablet 75 milliGRAM(s) Oral daily  dextrose 50% Injectable 12.5 Gram(s) IV Push once  dextrose 50% Injectable 25 Gram(s) IV Push once  dextrose 50% Injectable 25 Gram(s) IV Push once  dextrose Oral Gel 15 Gram(s) Oral once PRN  glucagon  Injectable 1 milliGRAM(s) IntraMuscular once  influenza  Vaccine (HIGH DOSE) 0.7 milliLiter(s) IntraMuscular once  insulin lispro (ADMELOG) corrective regimen sliding scale   SubCutaneous three times a day before meals  insulin lispro (ADMELOG) corrective regimen sliding scale   SubCutaneous at bedtime  latanoprost 0.005% Ophthalmic Solution 1 Drop(s) Both EYES at bedtime  melatonin 6 milliGRAM(s) Oral at bedtime  metoprolol succinate ER 50 milliGRAM(s) Oral two times a day  OLANZapine Injectable 1.25 milliGRAM(s) IntraMuscular every 6 hours PRN  oxymetazoline 0.05% Nasal Spray 2 Spray(s) Both Nostrils two times a day PRN  QUEtiapine 12.5 milliGRAM(s) Oral at bedtime  sacubitril 24 mG/valsartan 26 mG 1 Tablet(s) Oral two times a day  timolol 0.5% Solution 1 Drop(s) Both EYES two times a day      PAST MEDICAL & SURGICAL HISTORY:  CAD (Coronary Artery Disease) (ICD9 414.00)      Acute Myocardial Infarction (ICD9 410.90)  10/93 and 2/96      Diabetes Mellitus Type 2 in Nonobese (ICD9 250.00)      History of Prostate Cancer (ICD9 V10.46)  S/P seed implant 2004      HTN (Hypertension) (ICD9 401.9)      Legal Blindness,  right eye  right eye      Left Ventricular Dysfunction (ICD9 428.1)  h/o      ACID/Pacer  Medtronic model # [id number]      BPH (Benign Prostatic Hyperplasia)      Back Pain      Hypercholesteremia      Unspecified systolic (congestive) heart failure      Glaucoma      CVA (cerebrovascular accident)      Colon cancer      S/P Appendectomy  1962      S/P CABG X 4  1996      S/P Coronary Angiogram  1 stent placement 1998      Radium seed implants  12/2004      Cardiac Pacemaker,  Defibrillator  11/7/2009      AICD (automatic cardioverter/defibrillator) present  Medtronic      S/P colon resection          Vitals:  T(F): 97.4 (01-22), Max: 98.4 (01-21)  HR: 71 (01-22) (66 - 86)  BP: 109/74 (01-22) (103/63 - 115/80)  RR: 17 (01-22)  SpO2: 98% (01-22)  I&O's Summary    Physical Exam:  Appearance: No acute distress; well appearing  Neck: Supple, no JVD B/L, no Carotid Bruit B/L  Cardiovascular: RRR, S1, S2, no murmurs, rubs, or gallops, no edema  Respiratory: Clear to auscultation bilaterally, no RRW B/L  Gastrointestinal: soft, non-tender, non-distended with normal bowel sounds  Neurologic: No focal or neuro deficits noted  Psychiatry: AAOx3, mood & affect appropriate  Access Site: RCFA, Stable, w/o hematoma or bleed, pulses intact, back to baseline                            16.4   9.24  )-----------( 245      ( 22 Jan 2024 06:10 )             47.6     01-22    136  |  103  |  16  ----------------------------<  110<H>  4.1   |  21<L>  |  0.95    Ca    9.3      22 Jan 2024 11:00  Phos  3.8     01-22  Mg     2.30     01-22      PT/INR - ( 22 Jan 2024 06:10 )   PT: 11.5 sec;   INR: 1.03 ratio         PTT - ( 22 Jan 2024 06:10 )  PTT:36.9 sec            See Cardiac Imaging below  Patient seen and examined at bedside. Pt denies active CP, SOB, palpitations, diaphoresis, orthopnea, PND, dizziness, syncope, abdominal pain/discomfort, LE swelling or any other complaints at this time     No acute events overnight      REVIEW OF SYSTEMS:  Constitutional:     [ ] negative [ ] fevers [ ] chills [ ] weight loss [ ] weight gain  HEENT:                  [ ] negative [ ] dry eyes [ ] eye irritation [ ] postnasal drip [ ] nasal congestion  CV:                         [ ] negative  [ ] chest pain [ ] orthopnea [ ] palpitations [ ] murmur  Resp:                     [ ] negative [ ] cough [ ] shortness of breath [ ] dyspnea [ ] wheezing [ ] sputum [ ]hemoptysis  GI:                          [ ] negative [ ] nausea [ ] vomiting [ ] diarrhea [ ] constipation [ ] abd pain [ ] dysphagia   :                        [ ] negative [ ] dysuria [ ] nocturia [ ] hematuria [ ] increased urinary frequency  Musculoskeletal: [ ] negative [ ] back pain [ ] myalgias [ ] arthralgias [ ] fracture  Skin:                       [ ] negative [ ] rash [ ] itch  Neurological:        [ ] negative [ ] headache [ ] dizziness [ ] syncope [ ] weakness [ ] numbness  Psychiatric:           [ ] negative [ ] anxiety [ ] depression  Endocrine:            [ ] negative [ ] diabetes [ ] thyroid problem  Heme/Lymph:      [ ] negative [ ] anemia [ ] bleeding problem  Allergic/Immune: [ ] negative [ ] itchy eyes [ ] nasal discharge [ ] hives [ ] angioedema    [x ] All other systems negative  [ ] Unable to assess ROS due to    Current Meds:  aspirin enteric coated 81 milliGRAM(s) Oral daily  atorvastatin 40 milliGRAM(s) Oral at bedtime  clopidogrel Tablet 75 milliGRAM(s) Oral daily  dextrose 50% Injectable 12.5 Gram(s) IV Push once  dextrose 50% Injectable 25 Gram(s) IV Push once  dextrose 50% Injectable 25 Gram(s) IV Push once  dextrose Oral Gel 15 Gram(s) Oral once PRN  glucagon  Injectable 1 milliGRAM(s) IntraMuscular once  influenza  Vaccine (HIGH DOSE) 0.7 milliLiter(s) IntraMuscular once  insulin lispro (ADMELOG) corrective regimen sliding scale   SubCutaneous three times a day before meals  insulin lispro (ADMELOG) corrective regimen sliding scale   SubCutaneous at bedtime  latanoprost 0.005% Ophthalmic Solution 1 Drop(s) Both EYES at bedtime  melatonin 6 milliGRAM(s) Oral at bedtime  metoprolol succinate ER 50 milliGRAM(s) Oral two times a day  OLANZapine Injectable 1.25 milliGRAM(s) IntraMuscular every 6 hours PRN  oxymetazoline 0.05% Nasal Spray 2 Spray(s) Both Nostrils two times a day PRN  QUEtiapine 12.5 milliGRAM(s) Oral at bedtime  sacubitril 24 mG/valsartan 26 mG 1 Tablet(s) Oral two times a day  timolol 0.5% Solution 1 Drop(s) Both EYES two times a day      PAST MEDICAL & SURGICAL HISTORY:  CAD (Coronary Artery Disease) (ICD9 414.00)      Acute Myocardial Infarction (ICD9 410.90)  10/93 and 2/96      Diabetes Mellitus Type 2 in Nonobese (ICD9 250.00)      History of Prostate Cancer (ICD9 V10.46)  S/P seed implant 2004      HTN (Hypertension) (ICD9 401.9)      Legal Blindness,  right eye  right eye      Left Ventricular Dysfunction (ICD9 428.1)  h/o      ACID/Pacer  Medtronic model # [id number]      BPH (Benign Prostatic Hyperplasia)      Back Pain      Hypercholesteremia      Unspecified systolic (congestive) heart failure      Glaucoma      CVA (cerebrovascular accident)      Colon cancer      S/P Appendectomy  1962      S/P CABG X 4  1996      S/P Coronary Angiogram  1 stent placement 1998      Radium seed implants  12/2004      Cardiac Pacemaker,  Defibrillator  11/7/2009      AICD (automatic cardioverter/defibrillator) present  Medtronic      S/P colon resection          Vitals:  T(F): 97.4 (01-22), Max: 98.4 (01-21)  HR: 71 (01-22) (66 - 86)  BP: 109/74 (01-22) (103/63 - 115/80)  RR: 17 (01-22)  SpO2: 98% (01-22)  I&O's Summary    Physical Exam:  Appearance: No acute distress; well appearing  Neck: Supple, no JVD B/L, no Carotid Bruit B/L  Cardiovascular: RRR, S1, S2, no murmurs, rubs, or gallops, no edema  Respiratory: Clear to auscultation bilaterally, no RRW B/L  Gastrointestinal: soft, non-tender, non-distended with normal bowel sounds  Neurologic: No focal or neuro deficits noted  Psychiatry: AAOx3, mood & affect appropriate  Access Site: RCFA, Stable, w/o hematoma or bleed, pulses intact, back to baseline                            16.4   9.24  )-----------( 245      ( 22 Jan 2024 06:10 )             47.6     01-22    136  |  103  |  16  ----------------------------<  110<H>  4.1   |  21<L>  |  0.95    Ca    9.3      22 Jan 2024 11:00  Phos  3.8     01-22  Mg     2.30     01-22      PT/INR - ( 22 Jan 2024 06:10 )   PT: 11.5 sec;   INR: 1.03 ratio         PTT - ( 22 Jan 2024 06:10 )  PTT:36.9 sec            See Cardiac Imaging below

## 2024-01-22 NOTE — PROGRESS NOTE ADULT - PROBLEM SELECTOR PLAN 2
- Currently euvolemic  - TTE 1/15: left ventricular systolic function is severely decreased with a calculated ejection fraction of 23%  - Metoprolol succinate 50 bid  - Entresto 24-26 bid  - Farxiga 10 daily - Currently euvolemic  - TTE 1/15: left ventricular systolic function is severely decreased with a calculated ejection fraction of 23%  - Metoprolol succinate 50 bid  - Entresto 24-26 bid  - Farxiga 10 daily  - Cardiology on board

## 2024-01-22 NOTE — PROGRESS NOTE ADULT - PROBLEM SELECTOR PROBLEM 3
CAD (coronary artery disease)
Acute encephalopathy
CAD (coronary artery disease)

## 2024-02-14 ENCOUNTER — OUTPATIENT (OUTPATIENT)
Dept: OUTPATIENT SERVICES | Facility: HOSPITAL | Age: 84
LOS: 1 days | End: 2024-02-14

## 2024-02-14 VITALS
HEART RATE: 84 BPM | WEIGHT: 141.98 LBS | OXYGEN SATURATION: 97 % | RESPIRATION RATE: 15 BRPM | DIASTOLIC BLOOD PRESSURE: 73 MMHG | HEIGHT: 66 IN | TEMPERATURE: 98 F | SYSTOLIC BLOOD PRESSURE: 125 MMHG

## 2024-02-14 DIAGNOSIS — Z90.49 ACQUIRED ABSENCE OF OTHER SPECIFIED PARTS OF DIGESTIVE TRACT: Chronic | ICD-10-CM

## 2024-02-14 DIAGNOSIS — E11.9 TYPE 2 DIABETES MELLITUS WITHOUT COMPLICATIONS: ICD-10-CM

## 2024-02-14 DIAGNOSIS — I50.9 HEART FAILURE, UNSPECIFIED: ICD-10-CM

## 2024-02-14 DIAGNOSIS — I25.10 ATHEROSCLEROTIC HEART DISEASE OF NATIVE CORONARY ARTERY WITHOUT ANGINA PECTORIS: ICD-10-CM

## 2024-02-14 DIAGNOSIS — Z91.89 OTHER SPECIFIED PERSONAL RISK FACTORS, NOT ELSEWHERE CLASSIFIED: ICD-10-CM

## 2024-02-14 DIAGNOSIS — Z95.810 PRESENCE OF AUTOMATIC (IMPLANTABLE) CARDIAC DEFIBRILLATOR: ICD-10-CM

## 2024-02-14 RX ORDER — TIMOLOL 0.5 %
1 DROPS OPHTHALMIC (EYE)
Qty: 0 | Refills: 0 | DISCHARGE

## 2024-02-14 RX ORDER — ASPIRIN/CALCIUM CARB/MAGNESIUM 324 MG
1 TABLET ORAL
Refills: 0 | DISCHARGE

## 2024-02-14 RX ORDER — LATANOPROST 0.05 MG/ML
1 SOLUTION/ DROPS OPHTHALMIC; TOPICAL
Qty: 0 | Refills: 0 | DISCHARGE

## 2024-02-14 RX ORDER — METFORMIN HYDROCHLORIDE 850 MG/1
1 TABLET ORAL
Refills: 0 | DISCHARGE

## 2024-02-14 NOTE — H&P PST ADULT - NS MD HP INPLANTS MED DEV
medtronic [id number] serial [id number], cardiac stentsx 3/Automatic Implantable Cardioverter Defibrillator/Vascular stents/Clips

## 2024-02-14 NOTE — H&P PST ADULT - PROBLEM SELECTOR PLAN 1
Patient is tentatively scheduled for MDT Bi V ICD down grade to Biv pacemaker  with Dr Cruz- on 02/202/24    Pre-op instructions provided. Pt given verbal and written instructions with teach back on chlorhexidine wash and pepcid. Pt verbalized understanding with return demonstration.    Recent CBC,BMP, A1C results in chart    Patient has Medtronic [id number] Viva XT CRT-D- Biv ICD, Serial number- [id number], OR notified  implanted on 02/4/2016  Hold farxiga for 3 days prior to surgery- LD on 02/16/24 Patient is tentatively scheduled for MDT Bi V ICD down grade to Biv pacemaker  with Dr Cruz- on 02/202/24    Pre-op instructions provided. Pt given verbal and written instructions with teach back on chlorhexidine wash and pepcid. Pt verbalized understanding with return demonstration.    Recent CBC,BMP, A1C results in chart    Patient has Medtronic [id number] Viva XT CRT-D- Biv ICD, Serial number- [id number], OR notified  implanted on 02/4/2016  Hold Farxiga for 3 days prior to surgery- LD on 02/16/24

## 2024-02-14 NOTE — H&P PST ADULT - PROBLEM SELECTOR PLAN 4
Patient instructed to hold Metformin on the morning of procedure. Pt stated understanding.  A1C result in chart

## 2024-02-14 NOTE — H&P PST ADULT - NSICDXPASTMEDICALHX_GEN_ALL_CORE_FT
PAST MEDICAL HISTORY:  ACID/Pacer Medtronic model # [id number]    Acute encephalopathy     Acute Myocardial Infarction (ICD9 410.90) 10/93 and 2/96    Back Pain     BPH (Benign Prostatic Hyperplasia)     CAD (Coronary Artery Disease) (ICD9 414.00)     Colon cancer     CVA (cerebrovascular accident)     Diabetes Mellitus Type 2 in Nonobese (ICD9 250.00)     Glaucoma     History of Prostate Cancer (ICD9 V10.46) S/P seed implant 2004    HTN (Hypertension) (ICD9 401.9)     Hypercholesteremia     Left Ventricular Dysfunction (ICD9 428.1) h/o    Legal Blindness,  right eye right eye    Unspecified systolic (congestive) heart failure

## 2024-02-14 NOTE — H&P PST ADULT - HISTORY OF PRESENT ILLNESS
83year old male scheduled for MDT Bi V ICD down grade to Biv pacemaker on 02/202/4  Pt with hx of cardiomyopathy, s/p Bi V ICD 2009, glaucoma, CVA X2 1993,  htn, dm type 2, CABG X4 1995, cardiac stent X1 1998,  prostate cancer tx with radioactive seeds 2004, colon cancer 2015 tx with colon resection denies chemo and radiation.  Since 12/2023 has had ringing in pacemaker."  Patient was admitted on 01/16/24 for  NSTEMI. Device interrogation revealed the device was at ROLY and had no events.        *** Patient was peviously scheduled for Bi V pacemaker placement- 01/17/24, postponed due to NSTEMI- 01/16/24. patient recently saw EP Dr. Cruz with interrogation revealing ROLY with plan for downgrade to BiV PPM and generator change.  83year old male scheduled for MDT Bi V ICD down grade to Biv pacemaker on 02/20/24  Pt with hx of cardiomyopathy, s/p Bi V ICD 2009, glaucoma, CVA X2 1993,  htn, dm type 2, CABG X4 1995, cardiac stent X1 1998,  prostate cancer tx with radioactive seeds 2004, colon cancer 2015 tx with colon resection denies chemo and radiation.  Since 12/2023 has had ringing in pacemaker."  Patient was admitted on 01/16/24 for  NSTEMI. Device interrogation revealed the device was at ROLY and had no events.        *** Patient was peviously scheduled for Bi V pacemaker placement- 01/17/24, postponed due to NSTEMI- 01/16/24. patient recently saw EP Dr. Cruz with interrogation revealing ROLY with plan for downgrade to BiV PPM and generator change.  83year old male scheduled for MDT Bi V ICD down grade to Biv pacemaker on 02/20/24  Pt with hx of cardiomyopathy, s/p Bi V ICD 2009, glaucoma, CVA X2 1993,  htn, dm type 2, CABG X4 1995, cardiac stent X1 1998,  prostate cancer tx with radioactive seeds 2004, colon cancer 2015 tx with colon resection denies chemo and radiation.  Since 12/2023 has had ringing in pacemaker."  Patient was admitted on 01/16/24 for  NSTEMI. Device interrogation revealed the device was at ROLY and had no events.        *** Patient was previously scheduled for Bi V pacemaker placement- 01/17/24, postponed due to NSTEMI- 01/16/24. Patient recently saw EP Dr. Cruz with interrogation revealing ROLY with plan for downgrade to BiV PPM and generator change.

## 2024-02-14 NOTE — H&P PST ADULT - LAST CARDIAC ANGIOGRAM/IMAGING
1998 - 1 stent  inserted & 2000 no stent required- doesn't remember where procedures were done, s/p cath 01/18/24s/p LHC with LOVE x2 C 1/18: dLAD 80%, mRCA 100%, SVG to OM mid 90% x1 LOVE + distal 70% x1 LOVE 1998 - 1 stent  inserted & 2000 no stent required- doesn't remember where procedures were done, s/p cath on  01/18/24s/p C with LOVE x2 [facility] 1/18: dLAD 80%, mRCA 100%, SVG to OM mid 90% x1 LOVE + distal 70% x1 LOVE

## 2024-02-14 NOTE — H&P PST ADULT - PROBLEM SELECTOR PLAN 2
patient with PMH of CAD- s/p stents x1 in 1998, CABG, - recently admitted on 01/16/24 for NSTEMI- s/p cath-s/p cath 01/18/24s/p C with LOVE x2 [facility] 1/18: dLAD 80%, mRCA 100%, SVG to OM mid 90% x1 LOVE + distal 70% x1 Drug elluding stent.  Cath reports, EKG, ECHO results, last EP note in chart    Spoke to EAGLE Baxter regarding recent NSTEMI ,on DAPT therapy- continue aspirin and Plavix as per EP patient with PMH of CAD- s/p stents x1 in 1998, CABG x 4 1995,- recently admitted on 01/16/24 for NSTEMI- s/p cath-s/p cath 01/18/24s/p C with LOVE x2 [facility] 1/18: dLAD 80%, mRCA 100%, SVG to OM mid 90% x1 LOVE + distal 70% x1 Drug elluding stent.  Cath reports, EKG, ECHO results, last EP note in chart    Spoke to EAGLE Baxter regarding recent NSTEMI ,on DAPT therapy- continue aspirin and Plavix as per EP patient with PMH of CAD- s/p stents x1 in 1998, CABG x 4 1995,- recently admitted on 01/16/24 for NSTEMI- s/p cath-s/p cath 01/18/24s/p C with LOVE x2 [facility] 1/18: dLAD 80%, mRCA 100%, SVG to OM mid 90% x1 LOVE + distal 70% x1 Drug eluting stent.  Cath reports, EKG, ECHO results, last EP note in chart    Spoke to EAGLE Baxter regarding recent NSTEMI ,on DAPT therapy- continue aspirin and Plavix as per EP

## 2024-02-14 NOTE — H&P PST ADULT - PSYCHIATRIC COMMENTS
patient was diagnosed with acute encephalopathy during admission- prescribed quetiapine- denies taking it

## 2024-02-14 NOTE — H&P PST ADULT - CARDIOVASCULAR COMMENTS
left chest wall AICD left chest wall s/p Bi V ICD in 2009, HTN , recent cath - 01/18/24- s/p stents x2 stents- denies AICD shock

## 2024-02-20 ENCOUNTER — OUTPATIENT (OUTPATIENT)
Dept: OUTPATIENT SERVICES | Facility: HOSPITAL | Age: 84
LOS: 1 days | Discharge: HOME | End: 2024-02-20
Payer: MEDICARE

## 2024-02-20 DIAGNOSIS — Z95.810 PRESENCE OF AUTOMATIC (IMPLANTABLE) CARDIAC DEFIBRILLATOR: ICD-10-CM

## 2024-02-20 DIAGNOSIS — Z90.49 ACQUIRED ABSENCE OF OTHER SPECIFIED PARTS OF DIGESTIVE TRACT: Chronic | ICD-10-CM

## 2024-02-20 LAB
GLUCOSE BLDC GLUCOMTR-MCNC: 114 MG/DL — HIGH (ref 70–99)
GLUCOSE BLDC GLUCOMTR-MCNC: 137 MG/DL — HIGH (ref 70–99)

## 2024-02-20 PROCEDURE — 33229 REMV&REPLC PM GEN MULT LEADS: CPT

## 2024-02-20 PROCEDURE — 93010 ELECTROCARDIOGRAM REPORT: CPT

## 2024-02-20 RX ORDER — SODIUM CHLORIDE 9 MG/ML
3 INJECTION INTRAMUSCULAR; INTRAVENOUS; SUBCUTANEOUS EVERY 8 HOURS
Refills: 0 | Status: DISCONTINUED | OUTPATIENT
Start: 2024-02-20 | End: 2024-03-05

## 2024-02-20 NOTE — CHART NOTE - NSCHARTNOTEFT_GEN_A_CORE
ELECTROPHYSIOLOGY    Patient is an 82 yo male with past medical history of HTN, HLD, MDT2, CAD, prostate cancer, CHF cardiomyopathy s/p Biventricular ICD IN 2009.   Patient with BiV-ICD at [facility]. Now s/p downgrade of the device to biventricular PPM.  Tolerated the procedure well. No complications. No immediate complications.   Vital signs stable.  AV pacing on telemetry.   Dressing dry and intact. No evidence of bleeding, ecchymosis or swelling.  Instructed to remove dressing in 24 hours.   Post procedure PPM teaching done with patient and his niece.   Written instructions and contact information provided.   He was given a home monitor with verbal and written instructions.   Patient has a follow-up in the device clinic on 3/5/24 at 10:40am  4th floor Oncology Building [phone].

## 2024-02-20 NOTE — CHART NOTE - NSCHARTNOTEFT_GEN_A_CORE
The patient presents today for elective BiVICD downgrade to BiVPPM.  See hard copy of H&P from [employer] and PST.  The patient denies chest pain, SOB, palpitations, dizziness, presyncope, syncope,  headache, visual disturbances, CVA, PE, DVT, ANGEL, abdominal pain, N/V/D/C, hematochezia, melena, dysuria, hematuria, fever, chills.  Medications reviewed.  Last dose of Farxiga was taken 3 days ago.  The patient denies any new complaints since the last time he was seen by Dr. Cruz and in PST.

## 2024-03-05 ENCOUNTER — APPOINTMENT (OUTPATIENT)
Dept: ELECTROPHYSIOLOGY | Facility: CLINIC | Age: 84
End: 2024-03-05
Payer: MEDICARE

## 2024-03-05 ENCOUNTER — NON-APPOINTMENT (OUTPATIENT)
Age: 84
End: 2024-03-05

## 2024-03-05 DIAGNOSIS — I50.9 HEART FAILURE, UNSPECIFIED: ICD-10-CM

## 2024-03-05 PROCEDURE — 99024 POSTOP FOLLOW-UP VISIT: CPT

## 2024-03-05 RX ORDER — DAPAGLIFLOZIN 10 MG/1
10 TABLET, FILM COATED ORAL DAILY
Refills: 0 | Status: ACTIVE | COMMUNITY

## 2024-06-04 ENCOUNTER — APPOINTMENT (OUTPATIENT)
Dept: ELECTROPHYSIOLOGY | Facility: CLINIC | Age: 84
End: 2024-06-04

## 2024-06-19 NOTE — PATIENT PROFILE ADULT - FUNCTIONAL SCREEN CURRENT LEVEL: COMMUNICATION, MLM
PAST SURGICAL HISTORY:  Abscess, ovarian     H/O cholecystitis h/o lap neil    History of back surgery     History of kidney stones     History of total right hip replacement     S/P cataract surgery      0 = understands/communicates without difficulty

## 2024-07-02 NOTE — H&P PST ADULT - NSICDXNOFAMILYHX_GEN_ALL_CORE
Quality 110: Preventive Care And Screening: Influenza Immunization: Influenza Immunization previously received during influenza season
Quality 226: Preventive Care And Screening: Tobacco Use: Screening And Cessation Intervention: Patient screened for tobacco use and is an ex/non-smoker
Quality 111:Pneumonia Vaccination Status For Older Adults: Patient received any pneumococcal conjugate or polysaccharide vaccine on or after their 60th birthday and before the end of the measurement period
Quality 431: Preventive Care And Screening: Unhealthy Alcohol Use - Screening: Patient not identified as an unhealthy alcohol user when screened for unhealthy alcohol use using a systematic screening method
Detail Level: Detailed
<-- Click to add NO pertinent Family History

## 2024-07-22 NOTE — PATIENT PROFILE ADULT - FALL HARM RISK - TYPE OF ASSESSMENT
Post-Anesthesia Evaluation & Assessment    Vitals  BP: 128/75  Temp: 97.8 °F (36.6 °C)  Temp Source: Oral  Pulse: 82  Respirations: 10  SpO2: 100 %  Height: 180.3 cm (5' 10.98\")  Weight - Scale: 113.3 kg (249 lb 11.2 oz)  Pain Level: 0    Nausea/Vomiting: Controlled.    Post-operative hydration adequate.    Pain managed.    Mental status & Level of consciousness: alert and oriented x 3    Neurological status: moves all extremities, sensation grossly intact    Pulmonary status: airway patent, adequate oxygenation.    Complications related to anesthesia: none    Patient has met all PACU discharge requirements.      Cisco Hawkins, DO   Admission

## 2024-09-11 ENCOUNTER — APPOINTMENT (OUTPATIENT)
Dept: ELECTROPHYSIOLOGY | Facility: CLINIC | Age: 84
End: 2024-09-11

## 2024-10-17 ENCOUNTER — APPOINTMENT (OUTPATIENT)
Dept: ELECTROPHYSIOLOGY | Facility: CLINIC | Age: 84
End: 2024-10-17

## 2024-11-21 ENCOUNTER — APPOINTMENT (OUTPATIENT)
Dept: ELECTROPHYSIOLOGY | Facility: CLINIC | Age: 84
End: 2024-11-21

## 2025-08-02 NOTE — PROGRESS NOTE ADULT - NS ATTEND AMEND GEN_ALL_CORE FT
Rapid Response   Responding MD Dr. Woodson   STAT RN Peggy   Primary RN Nicky   Additional Notes Secondary   Rapid Response Neuro Alert   Location/Unit at Time of Call ED B9 (No [place], noticed when doing chart review post STAT call)   Team Arrival 0730   End Time 0815   Primary Reason for Call Concern for sepsis   Call Initiated by Other   Interventions During Call     Labs Drawn During Call     Medications Given During Call     Recommendations/Outcomes Stabilized remains on current unit   Admission Source ED     Sepsis RN Response Documentation    Sepsis alert type: ED Severe Sepsis Alert    Sepsis huddle initiated between sepsis hospitalist, attending, primary RN, and Sepsis RN: Not applicable, ED alert    Sepsis nurse response: [employer] Chat    Primary RN in possession of pink sheet: Yes - RN had prior to Sepsis RN arrival.    Reason for Sepsis BPA alert: WBC 20.3; Creatinine 1.19; .     Sepsis RN alerted of this patient. See above information for details of the sepsis RN response. Opportunities for further questions provided to primary RN and all questions answered.     Inpatient: Page 22 and request the STAT team for any additional concerns or deterioration in patient status.      ED: Page 22 for a \"Sepsis Alert\" for any further sepsis-related concerns while the patient is in the ED.   
83 year old man with PMHx early dementia? hx cardiomyopathy, s/p MDT ICD, glaucoma, CVA 1993? (ambulates with walker), HTN, DM2, CABG x4 1995, cardiac stent 1998, prostate cancer tx with radioactive seeds 2004, colon cancer tx with colon resection 2015 presenting with epigastric chest pain and admitted for NSTEMI. Device interrogation revealed the device was at ROLY and had no events. Of note patient recently saw EP Dr. Cruz with interrogation revealing ROLY with plan for downgrade to BiV PPM and generator change when ready and possibly as outpt.
Jeanclaude Chassagne is an 83 year old man with PMHx early dementia? hx cardiomyopathy, s/p MDT ICD, glaucoma, CVA 1993? (ambulates with walker), HTN, DM2, CABG x4 1995, cardiac stent 1998, prostate cancer tx with radioactive seeds 2004, colon cancer tx with colon resection 2015 presenting with epigastric chest pain and admitted for NSTEMI. Device interrogation revealed the device was at ROLY and had no events. Of note patient's device interrogation revealed battery at ROLY with plan for downgrade to BiV PPM and generator change.  The alternatives, risks, and benefits were explained in detail which included but not limited to bleeding requiring transfusion, infection, stroke, plural effusion, esophageal injury, pericardial effusion, cardiac tamponade requiring chest tube, intubation, and death. Patient expressed understanding and all questions were answered. Patient was AOx3 (person, place and time) and was able to explain why his BiV-ICD is being downgrade and the risk associated with the procedure. Patient's niece was present during this conversation and patient consented to the procedure. Cardiology consulted for ACS and is planned for LHC. F/U TTE. GDMT and Ischemic workup per cardiology- plan for LHC today per cardiology. Plan for BiV ICD downgrade to BiV PPM on Monday 1/22/2024
This is a 83 year old man with PMHx early dementia? hx cardiomyopathy, s/p MDT ICD, glaucoma, CVA 1993? (ambulates with walker), HTN, DM2, CABG x4 1995, cardiac stent 1998, prostate cancer tx with radioactive seeds 2004, colon cancer tx with colon resection 2015 presenting with epigastric chest pain and admitted for NSTEMI. Device interrogation revealed the device was at ROLY and had no events. Recent interrogation revealed ROLY with plan for downgrade to BiV PPM and generator change.  The alternatives, risks, and benefits were explained in detail which included but not limited to bleeding requiring transfusion, infection, stroke, plural effusion, esophageal injury, pericardial effusion, cardiac tamponade requiring chest tube, intubation, and death. Patient expressed understanding and all questions were answered. Patient was AOx3 (person, place and time) and was able to explain why his BiV-ICD is being downgraded and the risk associated with the procedure. Patient is s/p LHC yesterday dLAD 80%, mRCA 100%, SVG to OM mid 90% x1 LOVE + distal 70% x1 LOVE. Episodes of AIVR noted overnight up to 27 beats. Continue BB and increase dose as BP tolerates
This is a 83 year old man with PMHx early dementia? hx cardiomyopathy, s/p MDT ICD, glaucoma, CVA 1993? (ambulates with walker), HTN, DM2, CABG x4 1995, cardiac stent 1998, prostate cancer tx with radioactive seeds 2004, colon cancer tx with colon resection 2015 presenting with epigastric chest pain and admitted for NSTEMI. Device interrogation revealed the device was at ROLY and had no events. Recent visit to device clinic revealed ROLY with plan for downgrade to BiV PPM and generator change. Cardiology consulted for ACS and is planned for LHC. Course c/b by AMS. GDMT and Ischemic workup per cardiology- tentatively plan for LHC today. Plan for ICD downgrade to BiV PPM when medical stable.